# Patient Record
Sex: FEMALE | Race: WHITE | NOT HISPANIC OR LATINO | Employment: PART TIME | URBAN - METROPOLITAN AREA
[De-identification: names, ages, dates, MRNs, and addresses within clinical notes are randomized per-mention and may not be internally consistent; named-entity substitution may affect disease eponyms.]

---

## 2021-03-31 DIAGNOSIS — Z23 ENCOUNTER FOR IMMUNIZATION: ICD-10-CM

## 2022-02-22 ENCOUNTER — OFFICE VISIT (OUTPATIENT)
Dept: OBGYN CLINIC | Facility: MEDICAL CENTER | Age: 37
End: 2022-02-22
Payer: COMMERCIAL

## 2022-02-22 ENCOUNTER — TELEPHONE (OUTPATIENT)
Dept: POSTPARTUM | Facility: CLINIC | Age: 37
End: 2022-02-22

## 2022-02-22 VITALS
HEIGHT: 64 IN | SYSTOLIC BLOOD PRESSURE: 120 MMHG | BODY MASS INDEX: 31.76 KG/M2 | WEIGHT: 186 LBS | DIASTOLIC BLOOD PRESSURE: 86 MMHG

## 2022-02-22 DIAGNOSIS — R45.89 FEELING OF SADNESS: ICD-10-CM

## 2022-02-22 DIAGNOSIS — Z01.419 ROUTINE GYNECOLOGICAL EXAMINATION: Primary | ICD-10-CM

## 2022-02-22 DIAGNOSIS — R53.83 LOW ENERGY: ICD-10-CM

## 2022-02-22 DIAGNOSIS — Z30.41 ENCOUNTER FOR SURVEILLANCE OF CONTRACEPTIVE PILLS: ICD-10-CM

## 2022-02-22 PROCEDURE — S0610 ANNUAL GYNECOLOGICAL EXAMINA: HCPCS | Performed by: PHYSICIAN ASSISTANT

## 2022-02-22 RX ORDER — NORETHINDRONE ACETATE AND ETHINYL ESTRADIOL AND FERROUS FUMARATE 1MG-20(21)
KIT ORAL
COMMUNITY
Start: 2022-01-21 | End: 2022-02-22 | Stop reason: SDUPTHER

## 2022-02-22 RX ORDER — NORETHINDRONE ACETATE AND ETHINYL ESTRADIOL AND FERROUS FUMARATE 1MG-20(21)
1 KIT ORAL DAILY
Qty: 90 TABLET | Refills: 4 | Status: SHIPPED | OUTPATIENT
Start: 2022-02-22

## 2022-02-22 NOTE — PROGRESS NOTES
Assessment   29 y o   [id number] presenting for annual exam      Plan   Diagnoses and all orders for this visit:    Routine gynecological examination    Encounter for surveillance of contraceptive pills  -     Junel FE 1/20 1-20 MG-MCG per tablet; Take 1 tablet by mouth daily  Withdrawal bleeds are light and reg on AMPARO  Denies ACHES and desires to continue  Rx sent to pharmacy  Low energy  -     TSH, 3rd generation with Free T4 reflex; Future    Will check thyroid function to r/o as contributing to low energy/ fatigue, weight gain, etc      Feeling of sadness  -     Ambulatory Referral to Psychiatry; Future  Encouraged to establish with a counselor/therapist  Referral to [facility] and [address] provided  She will consider  Encouraged to continue exercise and current coping measures  Pap due         Encouraged 150 min of exercise per week  Reviewed balanced diet  To start Prenatal Vitamin      RTO one year for yearly exam or sooner as needed  __________________________________________________________________    Subjective     Laly Vazquez is a 29 y o     with regular menses who is sexually active and currently using Lonnie Dines for contraception presenting for annual exam  STD testing: She does not want STD testing today  C/o low energy/fatigue, weight gain, and not feeling like herself over the last year to few years  Notes most significant change occurred after suffering miscarriage but seems to have increased over the past year or two  Notes she has trouble finding energy and motivation for simple tasks, I e  working out, is unhappy that she is currently heavier than she has ever been in her life, and not feeling like herself  Feels symptoms stemmed after she suffered a miscarriage at approximately 12w gestation  Over the past few months  has brought up idea of having another child  Magalie Llamas is unsure how she feels about this, noting she is not sure if she is ready yet   (becomes tearful in office)  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 seeing one but has not yet tried to establish  New to the area  No SI or HI reported  SCREENING  Last Pap: 2020 NILM / Neg ([facility]where from [facility])     GYN  Complaints: denies  Denies dyspareunia, genital discharge, genital ulcers, pelvic pain and vulvar/vaginal symptoms  Periods are regular every 28-30 days, lasting 5 days  Reports light to moderate flow  Dysmenorrhea: some mild dysmenorrhea with last period  Notes she was a few days late on starting that pill pack due to awaiting refill from prior provider  Also had COVID 19 shot 2021  Will plan to monitor upcoming periods for a change  Sexually active: Yes - single partner -   Hx STI: denies   Hx Abnormal pap: reports abnormal Pap as a teenager  Negative on f/u and has been negative since  We reviewed ASCCP guidelines for Pap testing today  This low risk patient meets criteria for q 3 year testing  Gardasil: She has not  completed the Gardasil series  Reviewed option for completion  She will consider  OB    (as above)      Complaints: denies  Denies change in urinary stream, dysuria, hematuria and nocturia    BREAST  Complaints: denies  Denies: breast lump, breast tenderness, dryness, nipple discharge, pruritus, skin color change and skin lesion(s)  Personal hx: None  Family hx: Denies fhx of breast, ovarian, or colon cancers    GENERAL  PMH reviewed/updated and is as below  Patient has not yet established with a PCP  Provided with local options  Exercise: Walking trail near house x 1 hour, usually a few x per week  Diet: trying to improve this      Past Medical History:   Diagnosis Date    Miscarriage     4 years ago since         History reviewed  No pertinent surgical history        Current Outpatient Medications:     Junel FE 1/20 1-20 MG-MCG per tablet, Take 1 tablet by mouth daily, Disp: 90 tablet, Rfl: 4    Allergies   Allergen Reactions    Ciprofloxacin Anaphylaxis and Swelling       Social History     Socioeconomic History    Marital status: /Civil Union     Spouse name: Not on file    Number of children: Not on file    Years of education: Not on file    Highest education level: Not on file   Occupational History    Not on file   Tobacco Use    Smoking status: Never Smoker    Smokeless tobacco: Never Used   Vaping Use    Vaping Use: Never used   Substance and Sexual Activity    Alcohol use: Yes     Alcohol/week: 1 0 standard drink     Types: 1 Glasses of wine per week     Comment: Social     Drug use: Never    Sexual activity: Yes     Partners: Male     Birth control/protection: OCP   Other Topics Concern    Not on file   Social History Narrative    Not on file     Social Determinants of Health     Financial Resource Strain: Not on file   Food Insecurity: Not on file   Transportation Needs: Not on file   Physical Activity: Not on file   Stress: Not on file   Social Connections: Not on file   Intimate Partner Violence: Not on file   Housing Stability: Not on file       Review of Systems     ROS:  Constitutional: Weight gain, fatiuge  Negative for appetite change  Respiratory: Negative for cough, wheezing, shortness of breath  Cardiovascular: Negative for chest pain, palpitations, and leg swelling  Gastrointestinal: Negative for abdominal pain and change in bowel habits/constipation/diarrhea  Breasts:  Negative for tenderness, pain or masses  Genitourinary: Negative for difficulty urinating, pelvic pain, vaginal bleeding, vaginal discharge, itching or odor  Psychiatric: Not feeling like self, sadness  Negative for anxiety       Objective      /86   Ht 5' 4" (1 626 m)   Wt 84 4 kg (186 lb)   LMP 02/15/2022   BMI 31 93 kg/m²     Physical Examination:    Patient appears well and is not in distress  Neck is supple without masses  Breasts are symmetrical without mass, tenderness, nipple discharge, skin changes or adenopathy  Heart: RRR  Lungs: CTA b/l  Abdomen is soft and nontender without masses  External genitals are normal without lesions or rashes  Urethral meatus and urethra are normal  Bladder is normal to palpation  Vagina is normal without discharge or bleeding  Cervix is normal without discharge or lesion  Uterus is normal, mobile, nontender without palpable mass  Adnexa are normal, nontender, without palpable mass

## 2022-04-06 ENCOUNTER — APPOINTMENT (OUTPATIENT)
Dept: LAB | Facility: MEDICAL CENTER | Age: 37
End: 2022-04-06
Payer: COMMERCIAL

## 2022-04-06 DIAGNOSIS — R53.83 LOW ENERGY: ICD-10-CM

## 2022-04-06 LAB — TSH SERPL DL<=0.05 MIU/L-ACNC: 3.36 UIU/ML (ref 0.45–4.5)

## 2022-04-06 PROCEDURE — 84443 ASSAY THYROID STIM HORMONE: CPT

## 2022-04-06 PROCEDURE — 36415 COLL VENOUS BLD VENIPUNCTURE: CPT

## 2022-11-17 ENCOUNTER — OFFICE VISIT (OUTPATIENT)
Dept: FAMILY MEDICINE CLINIC | Facility: CLINIC | Age: 37
End: 2022-11-17

## 2022-11-17 VITALS
HEIGHT: 64 IN | WEIGHT: 194 LBS | OXYGEN SATURATION: 98 % | DIASTOLIC BLOOD PRESSURE: 82 MMHG | HEART RATE: 89 BPM | BODY MASS INDEX: 33.12 KG/M2 | SYSTOLIC BLOOD PRESSURE: 125 MMHG

## 2022-11-17 DIAGNOSIS — E78.2 MIXED HYPERLIPIDEMIA: ICD-10-CM

## 2022-11-17 DIAGNOSIS — T78.40XA ALLERGY, INITIAL ENCOUNTER: ICD-10-CM

## 2022-11-17 DIAGNOSIS — K21.9 GASTROESOPHAGEAL REFLUX DISEASE WITHOUT ESOPHAGITIS: Primary | ICD-10-CM

## 2022-11-17 RX ORDER — FEXOFENADINE HCL 180 MG/1
180 TABLET ORAL DAILY
Qty: 30 TABLET | Refills: 4 | Status: SHIPPED | OUTPATIENT
Start: 2022-11-17

## 2022-11-17 RX ORDER — OMEPRAZOLE 20 MG/1
20 CAPSULE, DELAYED RELEASE ORAL DAILY
Qty: 90 CAPSULE | Refills: 0 | Status: SHIPPED | OUTPATIENT
Start: 2022-11-17

## 2022-11-17 RX ORDER — OMEPRAZOLE 20 MG/1
20 CAPSULE, DELAYED RELEASE ORAL DAILY
COMMUNITY
End: 2022-11-17 | Stop reason: SDUPTHER

## 2022-11-17 NOTE — ASSESSMENT & PLAN NOTE
Seasonal allergies  Responding well to seasonal use Allegra  Continue same  Medication refill called in

## 2022-11-17 NOTE — PROGRESS NOTES
Subjective:      Patient ID: Jose Talley is a 40 y o  female  HPI    Patient is here to establish care  States that she has history of acid reflux with started many years ago  Patient has tried many over-the-counter medications  Recently she has been on omeprazole  Patient takes the medication intermittently as needed only  States that she had an EGD many years ago and was advised that her  Lower esophageal sphincter was not tight  Also reports symptoms of seasonal allergies  Takes OTC allergy medication, prn as needed only  Requesting medication refills  No recent metabolic labs  Was told that her cholesterol was elevated when she went for blood donation  No fup on that  Patient denies any symptoms of chest pain or shortness of breath  Past Medical History:   Diagnosis Date   • Allergic    • GERD (gastroesophageal reflux disease)    • Miscarriage     4 years ago since 2022        Family History   Problem Relation Age of Onset   • Hypertension Mother    • Diabetes Mother    • Diabetes Father    • Thyroid disease Father    • Heart attack Maternal Grandmother    • Heart disease Maternal Grandmother    • Cancer Maternal Grandfather    • Dementia Paternal Grandmother    • Cancer Paternal Grandfather        History reviewed  No pertinent surgical history  reports that she has never smoked  She has never used smokeless tobacco  She reports current alcohol use of about 1 0 standard drink per week  She reports that she does not use drugs        Current Outpatient Medications:   •  Junel FE 1/20 1-20 MG-MCG per tablet, Take 1 tablet by mouth daily, Disp: 90 tablet, Rfl: 4  •  omeprazole (PriLOSEC) 20 mg delayed release capsule, Take 20 mg by mouth daily, Disp: , Rfl:     The following portions of the patient's history were reviewed and updated as appropriate: allergies, current medications, past family history, past medical history, past social history, past surgical history and problem list     Review of Systems   Constitutional: Negative  HENT:        Seasonal symptoms of nasal congestion due to seasonal allergies  Respiratory: Negative  Negative for shortness of breath  Cardiovascular: Negative  Negative for chest pain, palpitations and PND  Musculoskeletal: Negative for myalgias  Neurological: Negative  Psychiatric/Behavioral: Negative  Objective:    /82   Pulse 89   Ht 5' 4" (1 626 m)   Wt 88 kg (194 lb)   SpO2 98%   BMI 33 30 kg/m²      Physical Exam  Constitutional:       Appearance: She is well-developed and well-nourished  Cardiovascular:      Rate and Rhythm: Normal rate and regular rhythm  Heart sounds: Normal heart sounds  Pulmonary:      Effort: Pulmonary effort is normal       Breath sounds: Normal breath sounds  Abdominal:      General: Bowel sounds are normal       Palpations: Abdomen is soft  Neurological:      Mental Status: She is alert and oriented to person, place, and time  Psychiatric:         Behavior: Behavior normal          Judgment: Judgment normal            No results found for this or any previous visit (from the past 1008 hour(s))  Assessment/Plan:    No problem-specific Assessment & Plan notes found for this encounter  Problem List Items Addressed This Visit        Digestive    Gastroesophageal reflux disease without esophagitis - Primary     Chronic issue, since teenage  On otc Omeprazole prn only  Recent worsening of symptoms  Has EGD many years ago  Was advised that she has week LES  Continue Omeprazole, referred to GI  Relevant Medications    omeprazole (PriLOSEC) 20 mg delayed release capsule    Other Relevant Orders    Ambulatory Referral to Gastroenterology       Other    Allergies     Seasonal allergies  Responding well to seasonal use Allegra  Continue same  Medication refill called in           Relevant Medications    fexofenadine (ALLEGRA) 180 MG tablet    Mixed hyperlipidemia     Patient has a past history of dyslipidemia  Recommended a lipid panel to assess levels  Relevant Orders    Comprehensive metabolic panel    Lipid panel     I have spent 45 minutes with Patient  today in which greater than 50% of this time was spent in counseling/coordination of care regarding Prognosis, Risks and benefits of tx options, Intructions for management, Patient and family education, Importance of tx compliance, Risk factor reductions and Impressions

## 2022-11-17 NOTE — ASSESSMENT & PLAN NOTE
Chronic issue, since teenage  On otc Omeprazole prn only  Recent worsening of symptoms  Has EGD many years ago  Was advised that she has week LES  Continue Omeprazole, referred to GI

## 2022-11-28 LAB
ALBUMIN SERPL-MCNC: 4 G/DL (ref 3.6–5.1)
ALBUMIN/GLOB SERPL: 1.5 (CALC) (ref 1–2.5)
ALP SERPL-CCNC: 55 U/L (ref 31–125)
ALT SERPL-CCNC: 17 U/L (ref 6–29)
AST SERPL-CCNC: 18 U/L (ref 10–30)
BILIRUB SERPL-MCNC: 0.4 MG/DL (ref 0.2–1.2)
BUN SERPL-MCNC: 17 MG/DL (ref 7–25)
BUN/CREAT SERPL: NORMAL (CALC) (ref 6–22)
CALCIUM SERPL-MCNC: 9.3 MG/DL (ref 8.6–10.2)
CHLORIDE SERPL-SCNC: 104 MMOL/L (ref 98–110)
CHOLEST SERPL-MCNC: 256 MG/DL
CHOLEST/HDLC SERPL: 3.8 (CALC)
CO2 SERPL-SCNC: 28 MMOL/L (ref 20–32)
CREAT SERPL-MCNC: 0.84 MG/DL (ref 0.5–0.97)
GFR/BSA.PRED SERPLBLD CYS-BASED-ARV: 92 ML/MIN/1.73M2
GLOBULIN SER CALC-MCNC: 2.7 G/DL (CALC) (ref 1.9–3.7)
GLUCOSE SERPL-MCNC: 89 MG/DL (ref 65–99)
HDLC SERPL-MCNC: 67 MG/DL
LDLC SERPL CALC-MCNC: 165 MG/DL (CALC)
NONHDLC SERPL-MCNC: 189 MG/DL (CALC)
POTASSIUM SERPL-SCNC: 4.1 MMOL/L (ref 3.5–5.3)
PROT SERPL-MCNC: 6.7 G/DL (ref 6.1–8.1)
SODIUM SERPL-SCNC: 139 MMOL/L (ref 135–146)
TRIGL SERPL-MCNC: 121 MG/DL

## 2022-11-29 ENCOUNTER — TELEPHONE (OUTPATIENT)
Dept: FAMILY MEDICINE CLINIC | Facility: CLINIC | Age: 37
End: 2022-11-29

## 2022-11-29 NOTE — TELEPHONE ENCOUNTER
----- Message from Deward Hodgkins, MD sent at 11/29/2022  1:06 PM EST -----  Please call the patient regarding her abnormal result  advise fup to review results

## 2022-12-05 DIAGNOSIS — E78.2 MIXED HYPERLIPIDEMIA: Primary | ICD-10-CM

## 2022-12-29 ENCOUNTER — OFFICE VISIT (OUTPATIENT)
Dept: FAMILY MEDICINE CLINIC | Facility: CLINIC | Age: 37
End: 2022-12-29

## 2022-12-29 VITALS
OXYGEN SATURATION: 99 % | DIASTOLIC BLOOD PRESSURE: 88 MMHG | BODY MASS INDEX: 33.13 KG/M2 | HEART RATE: 122 BPM | SYSTOLIC BLOOD PRESSURE: 130 MMHG | TEMPERATURE: 100.7 F | RESPIRATION RATE: 18 BRPM | WEIGHT: 193 LBS

## 2022-12-29 DIAGNOSIS — J01.10 ACUTE NON-RECURRENT FRONTAL SINUSITIS: ICD-10-CM

## 2022-12-29 DIAGNOSIS — J02.9 SORE THROAT: Primary | ICD-10-CM

## 2022-12-29 RX ORDER — AMOXICILLIN 875 MG/1
875 TABLET, COATED ORAL 2 TIMES DAILY
Qty: 14 TABLET | Refills: 0 | Status: SHIPPED | OUTPATIENT
Start: 2022-12-29 | End: 2022-12-29 | Stop reason: SDUPTHER

## 2022-12-29 RX ORDER — AMOXICILLIN 875 MG/1
875 TABLET, COATED ORAL 2 TIMES DAILY
Qty: 20 TABLET | Refills: 0 | Status: SHIPPED | OUTPATIENT
Start: 2022-12-29 | End: 2023-01-08

## 2022-12-29 NOTE — PROGRESS NOTES
Subjective:      Patient ID: Lorri Zaidi is a 40 y o  female  Sore Throat   This is a new problem  The current episode started in the past 7 days  The problem has been gradually worsening  Neither side of throat is experiencing more pain than the other  There has been no fever  The pain is at a severity of 7/10  Associated symptoms include congestion, coughing, swollen glands and trouble swallowing  She has had exposure to strep  Past Medical History:   Diagnosis Date   • Allergic    • GERD (gastroesophageal reflux disease)    • Miscarriage     4 years ago since 2022        Family History   Problem Relation Age of Onset   • Hypertension Mother    • Diabetes Mother    • Diabetes Father    • Thyroid disease Father    • Heart attack Maternal Grandmother    • Heart disease Maternal Grandmother    • Cancer Maternal Grandfather    • Dementia Paternal Grandmother    • Cancer Paternal Grandfather        History reviewed  No pertinent surgical history  reports that she has never smoked  She has never used smokeless tobacco  She reports current alcohol use of about 1 0 standard drink per week  She reports that she does not use drugs        Current Outpatient Medications:   •  amoxicillin (AMOXIL) 875 mg tablet, Take 1 tablet (875 mg total) by mouth 2 (two) times a day for 7 days, Disp: 14 tablet, Rfl: 0  •  fexofenadine (ALLEGRA) 180 MG tablet, Take 1 tablet (180 mg total) by mouth daily, Disp: 30 tablet, Rfl: 4  •  Junel FE 1/20 1-20 MG-MCG per tablet, Take 1 tablet by mouth daily, Disp: 90 tablet, Rfl: 4  •  omeprazole (PriLOSEC) 20 mg delayed release capsule, Take 1 capsule (20 mg total) by mouth daily, Disp: 90 capsule, Rfl: 0    The following portions of the patient's history were reviewed and updated as appropriate: allergies, current medications, past family history, past medical history, past social history, past surgical history and problem list     Review of Systems   HENT: Positive for congestion, sore throat and trouble swallowing  Respiratory: Positive for cough  Objective:    /88 (BP Location: Left arm, Patient Position: Sitting, Cuff Size: Large)   Pulse (!) 122   Temp (!) 100 7 °F (38 2 °C) (Tympanic)   Resp 18   Wt 87 5 kg (193 lb)   SpO2 99%   BMI 33 13 kg/m²      Physical Exam  Constitutional:       Appearance: Normal appearance  HENT:      Mouth/Throat:      Pharynx: Posterior oropharyngeal erythema present  No oropharyngeal exudate  Cardiovascular:      Heart sounds: Normal heart sounds  Pulmonary:      Breath sounds: Normal breath sounds  Recent Results (from the past 1008 hour(s))   Lipid panel    Collection Time: 11/28/22  7:11 AM   Result Value Ref Range    Total Cholesterol 256 (H) <200 mg/dL    HDL 67 > OR = 50 mg/dL    Triglycerides 121 <150 mg/dL    LDL Calculated 165 (H) mg/dL (calc)    Chol HDLC Ratio 3 8 <5 0 (calc)    Non-HDL Cholesterol 189 (H) <130 mg/dL (calc)   Comprehensive metabolic panel    Collection Time: 11/28/22  7:11 AM   Result Value Ref Range    Glucose, Random 89 65 - 99 mg/dL    BUN 17 7 - 25 mg/dL    Creatinine 0 84 0 50 - 0 97 mg/dL    eGFR 92 > OR = 60 mL/min/1 73m2    SL AMB BUN/CREATININE RATIO NOT APPLICABLE 6 - 22 (calc)    Sodium 139 135 - 146 mmol/L    Potassium 4 1 3 5 - 5 3 mmol/L    Chloride 104 98 - 110 mmol/L    CO2 28 20 - 32 mmol/L    Calcium 9 3 8 6 - 10 2 mg/dL    Protein, Total 6 7 6 1 - 8 1 g/dL    Albumin 4 0 3 6 - 5 1 g/dL    Globulin 2 7 1 9 - 3 7 g/dL (calc)    Albumin/Globulin Ratio 1 5 1 0 - 2 5 (calc)    TOTAL BILIRUBIN 0 4 0 2 - 1 2 mg/dL    Alkaline Phosphatase 55 31 - 125 U/L    AST 18 10 - 30 U/L    ALT 17 6 - 29 U/L       Assessment/Plan:    No problem-specific Assessment & Plan notes found for this encounter             Problem List Items Addressed This Visit        Respiratory    Acute non-recurrent frontal sinusitis    Relevant Medications    amoxicillin (AMOXIL) 875 mg tablet       Other    Sore throat - Primary    Relevant Medications    amoxicillin (AMOXIL) 875 mg tablet

## 2023-02-27 PROBLEM — J01.10 ACUTE NON-RECURRENT FRONTAL SINUSITIS: Status: RESOLVED | Noted: 2022-12-29 | Resolved: 2023-02-27

## 2023-03-23 ENCOUNTER — TELEPHONE (OUTPATIENT)
Dept: GASTROENTEROLOGY | Facility: CLINIC | Age: 38
End: 2023-03-23

## 2023-11-16 ENCOUNTER — OFFICE VISIT (OUTPATIENT)
Dept: OBGYN CLINIC | Facility: CLINIC | Age: 38
End: 2023-11-16
Payer: COMMERCIAL

## 2023-11-16 VITALS
SYSTOLIC BLOOD PRESSURE: 130 MMHG | BODY MASS INDEX: 34.31 KG/M2 | WEIGHT: 201 LBS | HEIGHT: 64 IN | DIASTOLIC BLOOD PRESSURE: 84 MMHG

## 2023-11-16 DIAGNOSIS — Z11.3 SCREENING FOR STD (SEXUALLY TRANSMITTED DISEASE): ICD-10-CM

## 2023-11-16 DIAGNOSIS — Z12.4 SCREENING FOR MALIGNANT NEOPLASM OF THE CERVIX: ICD-10-CM

## 2023-11-16 DIAGNOSIS — N91.2 AMENORRHEA: ICD-10-CM

## 2023-11-16 DIAGNOSIS — Z32.01 POSITIVE PREGNANCY TEST: ICD-10-CM

## 2023-11-16 DIAGNOSIS — K21.9 GASTROESOPHAGEAL REFLUX DISEASE WITHOUT ESOPHAGITIS: ICD-10-CM

## 2023-11-16 DIAGNOSIS — Z01.419 ENCOUNTER FOR GYNECOLOGICAL EXAMINATION (GENERAL) (ROUTINE) WITHOUT ABNORMAL FINDINGS: Primary | ICD-10-CM

## 2023-11-16 PROBLEM — J02.9 SORE THROAT: Status: RESOLVED | Noted: 2022-12-29 | Resolved: 2023-11-16

## 2023-11-16 PROCEDURE — S0612 ANNUAL GYNECOLOGICAL EXAMINA: HCPCS | Performed by: PHYSICIAN ASSISTANT

## 2023-11-16 PROCEDURE — G0476 HPV COMBO ASSAY CA SCREEN: HCPCS | Performed by: PHYSICIAN ASSISTANT

## 2023-11-16 PROCEDURE — 87591 N.GONORRHOEAE DNA AMP PROB: CPT | Performed by: PHYSICIAN ASSISTANT

## 2023-11-16 PROCEDURE — G0145 SCR C/V CYTO,THINLAYER,RESCR: HCPCS | Performed by: PHYSICIAN ASSISTANT

## 2023-11-16 PROCEDURE — 87491 CHLMYD TRACH DNA AMP PROBE: CPT | Performed by: PHYSICIAN ASSISTANT

## 2023-11-16 RX ORDER — FAMOTIDINE 20 MG/1
20 TABLET, FILM COATED ORAL 2 TIMES DAILY
Qty: 60 TABLET | Refills: 3 | Status: SHIPPED | OUTPATIENT
Start: 2023-11-16

## 2023-11-16 NOTE — PROGRESS NOTES
ASSESSMENT & PLAN: Cely Sims is a 41 y.o.  with normal gynecologic exam.    1.  Routine well woman exam done today  2. Pap and HPV:  The patient's last pap and hpv was unknown. It was normal per patient. Pap and cotesting was done today. Current ASCCP Guidelines reviewed. GC/CT screen also performed   3. The following were reviewed in today's visit: breast self exam, STD testing, family planning choices, adequate intake of calcium and vitamin D, exercise, healthy diet, and age appropriate recommendations regarding screenings and prevention. 4. Positive home UPT, LMP 10/11/2023, 5w1d gest today. Pregnancy planned and accepted. Counseled pt w/ first trimester recommendations, common symptoms and things to look out for. Continue PNV daily, stressed hydration, well balanced diet and exercise as tolerated. She is on daily PPI for chronic GERD. Will slowly transition pt to pepcid as tolerated, will monitor. Will have pt scheduled for viability between 8-9 wks. Will then have pt meet with nurse for intake and start prenatal care. Pt encouraged to call in interim with any concerns or problems. RTO in 3-4 wks viability w/ OB. CC:  Annual Gynecologic Examination    HPI: Cely Sims is a 41 y.o. Kamille Diss who presents for annual gynecologic examination. She is a new patient with our office. She has the following concerns:  she reports recent positive pregnancy test 2 days ago. Her and partner and have been trying x 1 yr. States missed period and had positive home pregnancy test . LMP:10/11/2023; 5w1d, GRABIEL 2024. She has started PNV which she has been taking for months. She feels well, slight nausea in AM, no vomiting. Slight pelvic pressure, no pain or cramping. Denies vaginal discharge or bleeding. Pregnancy 2016 vaginal delivery - uncomplicated pregnancy, except for induced 2 weeks prior due to high BP. Denies preeclampsia dx.   She was not on medication for BP during or prior to pregnacy. Denies HTN at present. miscarriage in 2017 (approx 8 wks gest.)  She denies any pre-existing pelvic or homronal problems. She does take omeprazole daily for GERD. Needs it daily. Healthy diet Yes; drinks primarily water. 1 cup decaf coffee in Am. Vitamins Yes; PNV  Exercise Yes; 5 x a week. Patient's last menstrual period was 10/11/2023. Periods prior to pregnancy regular cycles. States would spot for a few days then stop for 2-3 days, then bleeding moderate x 5 days. Average cramping. Health Maintenance:      She does perform irregular monthly self breast exams. Generalized breast soreness since pos UPT, denies breast lump, swelling, nipple discharge or skin changes. She feels safe at home. Feels safe in relationship with her partner. Past Medical History:   Diagnosis Date    Allergic     GERD (gastroesophageal reflux disease)     Miscarriage     4 years ago since         History reviewed. No pertinent surgical history. Past OB/Gyn History:  OB History          2    Para   1    Term   1            AB   1    Living   1         SAB   1    IAB        Ectopic        Multiple        Live Births   1                 History of sexually transmitted infection: denies hx of gential herpes, CT, GC, trich. History of abnormal pap smears: early 20's . Had 1 colpo negative. Normal since. Patient is currently sexually active. heterosexual. Monogamous with . The current method of family planning is none.     Family History   Problem Relation Age of Onset    Hypertension Mother     Diabetes Mother     Diabetes Father     Thyroid disease Father     Heart attack Maternal Grandmother     Heart disease Maternal Grandmother     Cancer Maternal Grandfather     Dementia Paternal Grandmother     Cancer Paternal Grandfather        Family history of Breast/Uterine/Ovarian/Colon Cancer: colon ca grandfather    Social History:  Social History     Socioeconomic History    Marital status: /Civil Union     Spouse name: Not on file    Number of children: Not on file    Years of education: Not on file    Highest education level: Not on file   Occupational History    Not on file   Tobacco Use    Smoking status: Never    Smokeless tobacco: Never   Vaping Use    Vaping Use: Never used   Substance and Sexual Activity    Alcohol use: Yes     Alcohol/week: 1.0 standard drink of alcohol     Types: 1 Glasses of wine per week     Comment: Social     Drug use: Never    Sexual activity: Yes     Partners: Male     Birth control/protection: OCP   Other Topics Concern    Not on file   Social History Narrative    Not on file     Social Determinants of Health     Financial Resource Strain: Not on file   Food Insecurity: Not on file   Transportation Needs: Not on file   Physical Activity: Not on file   Stress: Not on file   Social Connections: Not on file   Intimate Partner Violence: Not on file   Housing Stability: Not on file         Allergies   Allergen Reactions    Ciprofloxacin Anaphylaxis and Swelling         Current Outpatient Medications:     famotidine (PEPCID) 20 mg tablet, Take 1 tablet (20 mg total) by mouth 2 (two) times a day, Disp: 60 tablet, Rfl: 3    omeprazole (PriLOSEC) 20 mg delayed release capsule, Take 1 capsule (20 mg total) by mouth daily, Disp: 90 capsule, Rfl: 0      Review of Systems  Constitutional :no fever, feels well, no tiredness, no recent weight gain or loss  ENT: no ear ache, no loss of hearing, no nosebleeds or nasal discharge, no sore throat or hoarseness. Cardiovascular: no complaints of slow or fast heart beat, no chest pain, no palpitations, no leg claudication or lower extremity edema. Respiratory: no complaints of shortness of shortness of breath, no MUÑOZ  Breasts:generalized mild breast soreness since pos UPT; no complaints of breast lump, or nipple discharge or skin change.   Gastrointestinal: nausea since UPT;  GERD stable with daily PPI; no complaints of abdominal pain, constipation, vomiting, or diarrhea or bloody stools  Genitourinary : no complaints of dysuria, incontinence, pelvic pain, no dysmenorrhea, vaginal discharge/itch/odor or abnormal vaginal bleeding and as noted in HPI. Musculoskeletal: no complaints of arthralgia, no myalgia, no joint swelling or stiffness, no limb pain or swelling. Integumentary: no complaints of skin rash or lesion, itching or dry skin  Neurological: no complaints of headache, no confusion, no numbness or tingling, no dizziness or fainting  Mental health: no anxiety, depression, SI    Objective      /84 (BP Location: Left arm)   Ht 5' 4" (1.626 m)   Wt 91.2 kg (201 lb)   LMP 10/11/2023   BMI 34.50 kg/m²   General:   appears stated age, cooperative, alert normal mood and affect. BMI 34.50   Neck: normal, supple,trachea midline, no masses. Thyroid palpated normal.    Heart: regular rate and rhythm, S1, S2 normal, no murmur, click, rub or gallop   Lungs: clear to auscultation bilaterally   Breasts: normal appearance, no masses or tenderness, No nipple retraction or dimpling, No nipple discharge or bleeding, No axillary or supraclavicular adenopathy, Normal to palpation without dominant masses   Abdomen: soft, non-tender, without masses or organomegaly   Vulva: normal female genitalia, no lesions   Vagina: normal vagina, no discharge, exudate, lesion, or erythema   Urethra: normal   Cervix: Normal, no discharge. PAP done. Nontender. Uterus: normal   Adnexa: no mass, fullness, tenderness   Lymphatic palpation of lymph nodes in neck, axilla, groin and/or other locations: no lymphadenopathy or masses noted   Skin normal skin turgor and no rashes.    Psychiatric orientation to person, place, and time: normal. mood and affect: normal

## 2023-11-18 LAB
C TRACH DNA SPEC QL NAA+PROBE: NEGATIVE
N GONORRHOEA DNA SPEC QL NAA+PROBE: NEGATIVE

## 2023-11-20 LAB
HPV HR 12 DNA CVX QL NAA+PROBE: NEGATIVE
HPV16 DNA CVX QL NAA+PROBE: NEGATIVE
HPV18 DNA CVX QL NAA+PROBE: NEGATIVE

## 2023-11-27 LAB
LAB AP GYN PRIMARY INTERPRETATION: NORMAL
Lab: NORMAL

## 2023-12-08 DIAGNOSIS — K21.9 GASTROESOPHAGEAL REFLUX DISEASE WITHOUT ESOPHAGITIS: ICD-10-CM

## 2023-12-08 RX ORDER — FAMOTIDINE 20 MG/1
20 TABLET, FILM COATED ORAL 2 TIMES DAILY
Qty: 180 TABLET | Refills: 1 | Status: SHIPPED | OUTPATIENT
Start: 2023-12-08

## 2023-12-18 ENCOUNTER — OFFICE VISIT (OUTPATIENT)
Dept: OBGYN CLINIC | Facility: CLINIC | Age: 38
End: 2023-12-18
Payer: COMMERCIAL

## 2023-12-18 VITALS
BODY MASS INDEX: 33.97 KG/M2 | DIASTOLIC BLOOD PRESSURE: 86 MMHG | HEIGHT: 64 IN | SYSTOLIC BLOOD PRESSURE: 130 MMHG | WEIGHT: 199 LBS

## 2023-12-18 DIAGNOSIS — N91.2 AMENORRHEA: Primary | ICD-10-CM

## 2023-12-18 LAB — SL AMB POCT URINE HCG: POSITIVE

## 2023-12-18 PROCEDURE — 81025 URINE PREGNANCY TEST: CPT | Performed by: OBSTETRICS & GYNECOLOGY

## 2023-12-18 PROCEDURE — 99213 OFFICE O/P EST LOW 20 MIN: CPT | Performed by: OBSTETRICS & GYNECOLOGY

## 2023-12-18 PROCEDURE — 76817 TRANSVAGINAL US OBSTETRIC: CPT | Performed by: OBSTETRICS & GYNECOLOGY

## 2023-12-18 NOTE — PROGRESS NOTES
"Assessment/Plan:     Diagnoses and all orders for this visit:    Amenorrhea  -     POCT urine HCG    Other orders  -     Prenatal Vit-Fe Fumarate-FA (PRENATAL VITAMINS PO); Take by mouth     38-year-old female  Prior vaginal delivery  History of gestational hypertension in the prior pregnancy  Amenorrhea viable IUP 9 weeks and 4 days by CRL  Plan  Prenatal vitamin daily  Goal of blood pressure less than 140/90 reviewed and discussed with patient  Baby aspirin 2 tabs at bedtime till 36 weeks  To be seen for OB intake    Subjective:      Patient ID: Adele Campbell is a 38 y.o. female.    HPI  38-year-old female presents to the office today for pregnancy confirmation and viability scan  She denies any complaint today except mild nausea and fatigue  Last menstrual period was 10/11 as per patient does not remember the exact date  This pregnancy is planned  The following portions of the patient's history were reviewed and updated as appropriate: allergies, current medications, past family history, past medical history, past social history, past surgical history and problem list.    Review of Systems   Constitutional:  Positive for fatigue. Negative for activity change, appetite change, chills and fever.   Respiratory:  Negative for apnea, cough, chest tightness and shortness of breath.    Cardiovascular:  Negative for chest pain, palpitations and leg swelling.   Gastrointestinal:  Positive for nausea. Negative for abdominal pain, constipation, diarrhea and vomiting.   Genitourinary:  Negative for difficulty urinating, dysuria, flank pain, frequency, hematuria and urgency.   Neurological:  Negative for dizziness, seizures, syncope, light-headedness, numbness and headaches.   Psychiatric/Behavioral:  Negative for agitation and confusion.          Objective:      /86 (BP Location: Left arm, Patient Position: Sitting, Cuff Size: Adult)   Ht 5' 4\" (1.626 m)   Wt 90.3 kg (199 lb)   LMP 10/16/2023   Breastfeeding No  "  BMI 34.16 kg/m²          Physical Exam  Constitutional:       Appearance: She is well-developed.   Abdominal:      General: There is no distension.      Palpations: Abdomen is soft.      Tenderness: There is no abdominal tenderness.   Genitourinary:     Labia:         Right: No rash, tenderness or lesion.         Left: No rash, tenderness or lesion.       Vagina: No signs of injury. No vaginal discharge, erythema or tenderness.      Cervix: No cervical motion tenderness, discharge or friability.      Adnexa:         Right: No mass, tenderness or fullness.          Left: No mass, tenderness or fullness.        Comments: Bedside ultrasound performed intrauterine pregnancy 9 weeks and 4 days by CRL fetal heart rate 130 bpm  Neurological:      Mental Status: She is alert and oriented to person, place, and time.   Psychiatric:         Behavior: Behavior normal.

## 2023-12-27 ENCOUNTER — INITIAL PRENATAL (OUTPATIENT)
Dept: OBGYN CLINIC | Facility: CLINIC | Age: 38
End: 2023-12-27

## 2023-12-27 VITALS
DIASTOLIC BLOOD PRESSURE: 78 MMHG | WEIGHT: 199.8 LBS | HEIGHT: 64 IN | SYSTOLIC BLOOD PRESSURE: 122 MMHG | BODY MASS INDEX: 34.11 KG/M2

## 2023-12-27 DIAGNOSIS — Z31.430 ENCOUNTER OF FEMALE FOR TESTING FOR GENETIC DISEASE CARRIER STATUS FOR PROCREATIVE MANAGEMENT: ICD-10-CM

## 2023-12-27 DIAGNOSIS — Z36.9 ENCOUNTER FOR ANTENATAL SCREENING: Primary | ICD-10-CM

## 2023-12-27 RX ORDER — ASPIRIN 81 MG/1
81 TABLET, CHEWABLE ORAL DAILY
COMMUNITY

## 2023-12-27 NOTE — PROGRESS NOTES
.OB Intake    Patient presents for OB intake interview.  Accompanied by: herself   planned pregnancy, FOB involved and supportive.      Hx of  delivery prior to 36 weeks 6 days: no  Hx of hypertension:  yes   Patient's last menstrual period was 10/11/2023. Estimated Date of Delivery: 2024  Signs and Symptoms of pregnancy:  fatigue  Constipation: no  Headaches: no  Cramping/spotting: no  PICA cravings: no  Cats:No  Diabetes:   History of gestational diabetes no  BMI >35  yes  Advance maternal age >35 yes  First degree relative with type 2 diabetes no  History of PCOS no  Current metformin use no  Prior history of macrosomia or LGA no    Prenatal labs including: CBC,ABO, Rubella, Hepatitis, RPR,HIV, , varicella, hemoglobin electrophoresis, Tsh/Free T 4   Last Pap:  2023  Tdap - counseled to be given after 27 weeks  Influenza vaccine discussed and information sheet given.  Vaccinated: No  COVId Vaccine :yes  Hx of MRSA: no  Dental visit with last 6 months - yes   recommendations discussed.   Patient's PHQ-9 score today was 3  Interview education:  [facility] Pregnancy Essentials booklet reviewed and explained. Handouts given at today's visit.   [facility] Baby & me phone application guide   [facility] support center   [facility] Maternal Fetal Medicine        Sequential screening pamphlet                   Cystic fibrosis information    [facility]: Yes  ONAF form submitted: No    Sussyt activated: Yes    Interview done by : Sandee Palomino LPN       23

## 2024-01-06 LAB
EXTERNAL ABO GROUPING: NORMAL
EXTERNAL ABO GROUPING: NORMAL
EXTERNAL HIV SCREEN: NORMAL
EXTERNAL RH FACTOR: POSITIVE
EXTERNAL RH FACTOR: POSITIVE
HCV AB SER-ACNC: NORMAL

## 2024-01-10 ENCOUNTER — ROUTINE PRENATAL (OUTPATIENT)
Facility: HOSPITAL | Age: 39
End: 2024-01-10
Attending: OBSTETRICS & GYNECOLOGY
Payer: COMMERCIAL

## 2024-01-10 VITALS
SYSTOLIC BLOOD PRESSURE: 130 MMHG | HEART RATE: 85 BPM | DIASTOLIC BLOOD PRESSURE: 84 MMHG | HEIGHT: 64 IN | BODY MASS INDEX: 33.84 KG/M2 | WEIGHT: 198.2 LBS

## 2024-01-10 DIAGNOSIS — Z3A.13 13 WEEKS GESTATION OF PREGNANCY: ICD-10-CM

## 2024-01-10 DIAGNOSIS — O09.521 ELDERLY MULTIGRAVIDA, FIRST TRIMESTER: Primary | ICD-10-CM

## 2024-01-10 DIAGNOSIS — Z36.82 ENCOUNTER FOR ANTENATAL SCREENING FOR NUCHAL TRANSLUCENCY: ICD-10-CM

## 2024-01-10 PROCEDURE — 36415 COLL VENOUS BLD VENIPUNCTURE: CPT | Performed by: OBSTETRICS & GYNECOLOGY

## 2024-01-10 PROCEDURE — 76801 OB US < 14 WKS SINGLE FETUS: CPT | Performed by: OBSTETRICS & GYNECOLOGY

## 2024-01-10 PROCEDURE — 76813 OB US NUCHAL MEAS 1 GEST: CPT | Performed by: OBSTETRICS & GYNECOLOGY

## 2024-01-10 PROCEDURE — 99203 OFFICE O/P NEW LOW 30 MIN: CPT | Performed by: OBSTETRICS & GYNECOLOGY

## 2024-01-10 NOTE — LETTER
January 12, 2024     Saida Jordan MD  [address]  [address]st [address]  [place] [address]    Patient: Adele Campbell   YOB: 1985   Date of Visit: 1/10/2024       Dear Dr. Jordan:    Thank you for referring Adele Campbell to me for evaluation. Below are my notes for this consultation.    If you have questions, please do not hesitate to call me. I look forward to following your patient along with you.         Sincerely,        Solo Mcgee MD        CC: No Recipients    Solo Mcgee MD  1/10/2024 10:43 AM  Sign when Signing Visit  Please refer to the [facility] ultrasound report in Ob Procedures for additional information regarding today's visit

## 2024-01-10 NOTE — PROGRESS NOTES
Please refer to the [facility] ultrasound report in Ob Procedures for additional information regarding today's visit

## 2024-01-10 NOTE — PROGRESS NOTES
"Patient chose to have [employer] Non-invasive Prenatal Screen with fetal sex.   Patient choose self-pay option.   Patient assistance program (PAP) application provided to patient no.  PAP sent with specimen No.     Patient given brochure and is aware [employer] will contact their insurance and coordinate coverage.  Patient made aware she will receive a text message and e-mail from [employer].   Patient informed text/phone call message will come from area code  \"415\".  Provided [facility] Client Services # [phone] and web site at [email or web address].   \"[place] your test online\" card with barcode and test tube ID provided to patient.   Reviewed [facility] web site states 5-7 business days for results via their portal.   [email or web address] message will be sent to patient when [facility] receives results /provider reviews.    2 vials of blood drawn from  right arm by Juliana Reyes MA.   Patient tolerated blood draw without difficulty.  Specimens labeled with patient identifiers (name, date of birth, specimen collection date), order and specimen were verified with patient, packed and sent via [facility] lab .  FED EX  tracking #  [id number]  Copy of lab order scanned to Epic media.    Maternal Fetal Medicine will have results in approximately 7-10 business days and will call patient or notify via [employer].  Patient aware viewing lab result online will reveal fetal sex if ordered.    Patient verbalized understanding of all instructions and no questions at this time.    "

## 2024-01-19 ENCOUNTER — TELEPHONE (OUTPATIENT)
Dept: OBGYN CLINIC | Facility: CLINIC | Age: 39
End: 2024-01-19

## 2024-01-19 DIAGNOSIS — R10.2 PELVIC PRESSURE IN PREGNANCY, ANTEPARTUM, SECOND TRIMESTER: ICD-10-CM

## 2024-01-19 DIAGNOSIS — O26.859 SPOTTING DURING PREGNANCY: ICD-10-CM

## 2024-01-19 DIAGNOSIS — O26.892 PELVIC PRESSURE IN PREGNANCY, ANTEPARTUM, SECOND TRIMESTER: ICD-10-CM

## 2024-01-19 DIAGNOSIS — Z3A.14 14 WEEKS GESTATION OF PREGNANCY: Primary | ICD-10-CM

## 2024-01-19 NOTE — TELEPHONE ENCOUNTER
Bud called and spoke to patient regarding symptoms.  She is 14 weeks gestation, had NT with MFM over a week ago.  States about 2 hours ago she noticed some light pink spotting from her vagina when she wiped.  She noticed this a few times.  She has noticed slight pelvic pressure since then.  Denies any significant pelvic pain or cramping.  Denies any heavy bleeding or clotting.  Reassurance given to patient would recommend urgent OB ultrasound to evaluate.  This was scheduled and she will call [facility] central scheduling to be completed today or tomorrow.  Strict ED precaution was discussed through the weekend with any increase in pelvic pain or cramping or any heavier bleeding prior to completing ultrasound.  Patient also given information to contact on-call physician to discuss if needed.  Patient expresses understanding and agreeable to plan.

## 2024-01-19 NOTE — TELEPHONE ENCOUNTER
Patient 14 weeks gestation, called with complaint of spotting, no sexual intercourse feels heaviness , blood type B +

## 2024-01-20 ENCOUNTER — HOSPITAL ENCOUNTER (OUTPATIENT)
Dept: ULTRASOUND IMAGING | Facility: HOSPITAL | Age: 39
Discharge: HOME | End: 2024-01-20
Payer: COMMERCIAL

## 2024-01-20 DIAGNOSIS — O26.892 PELVIC PRESSURE IN PREGNANCY, ANTEPARTUM, SECOND TRIMESTER: ICD-10-CM

## 2024-01-20 DIAGNOSIS — Z3A.14 14 WEEKS GESTATION OF PREGNANCY: ICD-10-CM

## 2024-01-20 DIAGNOSIS — O26.859 SPOTTING DURING PREGNANCY: ICD-10-CM

## 2024-01-20 DIAGNOSIS — R10.2 PELVIC PRESSURE IN PREGNANCY, ANTEPARTUM, SECOND TRIMESTER: ICD-10-CM

## 2024-01-20 PROCEDURE — 76815 OB US LIMITED FETUS(S): CPT

## 2024-01-25 ENCOUNTER — ROUTINE PRENATAL (OUTPATIENT)
Dept: OBGYN CLINIC | Facility: CLINIC | Age: 39
End: 2024-01-25
Payer: COMMERCIAL

## 2024-01-25 VITALS
WEIGHT: 195.4 LBS | DIASTOLIC BLOOD PRESSURE: 82 MMHG | HEIGHT: 64 IN | SYSTOLIC BLOOD PRESSURE: 118 MMHG | BODY MASS INDEX: 33.36 KG/M2 | BODY MASS INDEX: 33.36 KG/M2 | SYSTOLIC BLOOD PRESSURE: 118 MMHG | DIASTOLIC BLOOD PRESSURE: 82 MMHG | WEIGHT: 195.4 LBS | HEIGHT: 64 IN

## 2024-01-25 DIAGNOSIS — Z3A.15 15 WEEKS GESTATION OF PREGNANCY: ICD-10-CM

## 2024-01-25 DIAGNOSIS — O09.522 ADVANCED MATERNAL AGE IN MULTIGRAVIDA, SECOND TRIMESTER: ICD-10-CM

## 2024-01-25 DIAGNOSIS — Z36.9 ANTENATAL SCREENING ENCOUNTER: ICD-10-CM

## 2024-01-25 DIAGNOSIS — Z34.82 ENCOUNTER FOR SUPERVISION OF OTHER NORMAL PREGNANCY, SECOND TRIMESTER: Primary | ICD-10-CM

## 2024-01-25 LAB
EXTERNAL AFP: NORMAL
SL AMB  POCT GLUCOSE, UA: ABNORMAL
SL AMB  POCT GLUCOSE, UA: ABNORMAL
SL AMB LEUKOCYTE ESTERASE,UA: ABNORMAL
SL AMB LEUKOCYTE ESTERASE,UA: ABNORMAL
SL AMB POCT NITRITE,UA: ABNORMAL
SL AMB POCT NITRITE,UA: ABNORMAL
SL AMB POCT URINE PROTEIN: ABNORMAL
SL AMB POCT URINE PROTEIN: ABNORMAL

## 2024-01-25 PROCEDURE — 81002 URINALYSIS NONAUTO W/O SCOPE: CPT | Performed by: PHYSICIAN ASSISTANT

## 2024-01-25 PROCEDURE — PNV: Performed by: PHYSICIAN ASSISTANT

## 2024-01-25 NOTE — PROGRESS NOTES
Assessment      Pregnancy 15 and 1/7 weeks     Plan    Routine OB visit early second trimester   Problem list reviewed and updated -GERD, AMA, history of gestational hypertension.  Labs reviewed as below.    No current problems or symptoms.  Had some spotting last week with normal OB ultrasound with reassuring live intrauterine gestation.  GERD currently controlled on daily omeprazole, patient unresponsive to Pepcid, Tums and lifestyle modification previously.    Genetic screening tests discussed:  NIPS screening negative.  Patient counseled regarding AFP, offered and accepted.  AFP blood work to be completed between 15 to 20 weeks gestation. .  Role of ultrasound in pregnancy discussed; fetal survey:  Level 2 scheduled 3/6 .  Amniocentesis discussed: not indicated.    Blood pressure today 118/82,  with good fetal movement.  B+ blood type.  Urine dip trace leuks, trace protein    Continue PNV, ASA daily  Stressed well-balanced diet, staying well-hydrated drinking plenty of water, exercise as tolerated.    Follow up in 4 weeks.      Chief Complaint   Patient presents with    Routine Prenatal Visit     Some spotting last Friday, had ultrasound  at hospital, no nausea or vomiting , 15 weeks gestation         Subjective   Adele Campbell is a 38 y.o. female being seen today for her obstetrical visit. She is at 15w1d gestation.   AMA, GERD, anxiety, history of gestational hypertension.  Acid reflux unresponsive to Tums and Pepcid.  Doing well on Prilosec with stable symptoms.    She is taking PNV, ASA daily.    Patient reports no bleeding, no contractions, no cramping, no leaking, and denies headaches, blurry vision, dizziness, nausea/vomiting, abdominal pain, swelling.  Normal urination and bowel movements . Fetal movement:  Not yet appreciated. .    Prenatal lab results reviewed with patient in detail today with normal CBC, B+ blood type, nonreactive syphilis, hepatitis B surface antigen nonreactive, rubella  "immune, HIV nonreactive, hepatitis C nonreactive, hemoglobinopathy panel unremarkable.  Thyroid function normal, varicella immune.    Menstrual History:  OB History          3    Para   1    Term   1            AB   1    Living   1         SAB   1    IAB   0    Ectopic   0    Multiple   0    Live Births   1                Menarche age: N/A  Patient's last menstrual period was 10/11/2023 (approximate).       The following portions of the patient's history were reviewed and updated as appropriate: allergies, current medications, past family history, past medical history, past social history, past surgical history, and problem list.    Review of Systems  Pertinent items are noted in HPI.     Objective     /82 (BP Location: Left arm, Patient Position: Sitting, Cuff Size: Adult)   Ht 5' 4\" (1.626 m)   Wt 88.6 kg (195 lb 6.4 oz)   LMP 10/11/2023 (Approximate)   Breastfeeding No   BMI 33.54 kg/m²   Uterine Size: Below umbilicus                     FHT: 156                                                       "

## 2024-01-31 ENCOUNTER — OFFICE VISIT (OUTPATIENT)
Dept: FAMILY MEDICINE CLINIC | Facility: CLINIC | Age: 39
End: 2024-01-31
Payer: COMMERCIAL

## 2024-01-31 VITALS
SYSTOLIC BLOOD PRESSURE: 125 MMHG | BODY MASS INDEX: 33.8 KG/M2 | HEIGHT: 64 IN | WEIGHT: 198 LBS | OXYGEN SATURATION: 99 % | HEART RATE: 104 BPM | DIASTOLIC BLOOD PRESSURE: 80 MMHG

## 2024-01-31 DIAGNOSIS — R35.0 FREQUENCY OF URINATION: Primary | ICD-10-CM

## 2024-01-31 LAB
SL AMB  POCT GLUCOSE, UA: NORMAL
SL AMB LEUKOCYTE ESTERASE,UA: NORMAL
SL AMB POCT BILIRUBIN,UA: NORMAL
SL AMB POCT BLOOD,UA: NORMAL
SL AMB POCT CLARITY,UA: CLEAR
SL AMB POCT COLOR,UA: YELLOW
SL AMB POCT KETONES,UA: NORMAL
SL AMB POCT NITRITE,UA: NORMAL
SL AMB POCT PH,UA: 7
SL AMB POCT SPECIFIC GRAVITY,UA: 1.01
SL AMB POCT URINE PROTEIN: NORMAL
SL AMB POCT UROBILINOGEN: NORMAL

## 2024-01-31 PROCEDURE — 99213 OFFICE O/P EST LOW 20 MIN: CPT | Performed by: FAMILY MEDICINE

## 2024-01-31 PROCEDURE — 81003 URINALYSIS AUTO W/O SCOPE: CPT | Performed by: FAMILY MEDICINE

## 2024-01-31 NOTE — ASSESSMENT & PLAN NOTE
Increased frequency of urination and dysuria x 2 days.   Patient also reports a foul odor to the urine.  Urine dip in the office is totally normal.  I have advised patient to contact her gynecologist if any of her symptoms change or worsen.  She can certainly follow-up for another urine test if any of her urinary symptoms change.

## 2024-01-31 NOTE — PROGRESS NOTES
Subjective:      Patient ID: Cammy Mariee is a 38 y.o. female.    Urinary Frequency   Associated symptoms include frequency.       Patient is here reporting symptoms of increased urine frequency, burning and foul-smelling urine for the past 2 days.  Patient is currently 16 weeks pregnant.  Was evaluated 2 weeks ago by GYN for symptoms of vaginal spotting.  Patient has undergone a vaginal ultrasound for evaluation at the time.  Denies any symptoms of low back pain or abdominal pain.  Is not reporting any symptoms of fever or chills.    Past Medical History:   Diagnosis Date    Abnormal Pap smear of cervix     Colpo X1    Allergic     GERD (gastroesophageal reflux disease)     Hypertension     Miscarriage     4 years ago since 2022     Varicella     as a child       Family History   Problem Relation Age of Onset    Hypertension Mother     Diabetes Mother     Crohn's disease Mother     Diabetes Father     Thyroid disease Father     SHEREE disease Father     No Known Problems Sister     No Known Problems Sister     Sickle cell trait Son     Heart attack Maternal Grandmother     Heart disease Maternal Grandmother     Cancer Maternal Grandfather     Dementia Paternal Grandmother     Cancer Paternal Grandfather        Past Surgical History:   Procedure Laterality Date    COLPOSCOPY          reports that she has never smoked. She has never used smokeless tobacco. She reports that she does not currently use alcohol after a past usage of about 1.0 standard drink of alcohol per week. She reports that she does not use drugs.      Current Outpatient Medications:     aspirin 81 mg chewable tablet, Chew 81 mg daily, Disp: , Rfl:     omeprazole (PriLOSEC) 20 mg delayed release capsule, Take 1 capsule (20 mg total) by mouth daily, Disp: 90 capsule, Rfl: 0    Prenatal Vit-Fe Fumarate-FA (PRENATAL VITAMINS PO), Take by mouth, Disp: , Rfl:     The following portions of the patient's history were reviewed and updated as appropriate:  "allergies, current medications, past family history, past medical history, past social history, past surgical history and problem list.    Review of Systems   Gastrointestinal: Negative.    Genitourinary:  Positive for dysuria and frequency.           Objective:    /80   Pulse 104   Ht 5' 4\" (1.626 m)   Wt 89.8 kg (198 lb)   LMP 10/11/2023 (Approximate)   SpO2 99%   BMI 33.99 kg/m²      Physical Exam  Constitutional:       Appearance: Normal appearance.   Cardiovascular:      Heart sounds: Normal heart sounds.   Pulmonary:      Breath sounds: Normal breath sounds.   Abdominal:      Palpations: Abdomen is soft.      Tenderness: There is no right CVA tenderness or left CVA tenderness.      Comments: Gravid uterus.            Recent Results (from the past 1008 hour(s))   Hepatitis C antibody    Collection Time: 01/06/24 12:00 AM   Result Value Ref Range    HEP C AB NON-REACTIVE    Human Immunodeficiency Virus 1/2 Antigen / Antibody ( Fourth Generation) with Reflex Testing    Collection Time: 01/06/24 12:00 AM   Result Value Ref Range    HIV Screen Non-Reactive    ABO/Rh    Collection Time: 01/06/24 12:00 AM   Result Value Ref Range    ABO Grouping B     External Rh Factor Positive    POCT urine dip    Collection Time: 01/25/24 11:20 AM   Result Value Ref Range    LEUKOCYTE ESTERASE,UA trace     NITRITE,UA neg     POCT URINE PROTEIN trace     GLUCOSE, UA neg    POCT urine dip    Collection Time: 01/31/24  3:57 PM   Result Value Ref Range    LEUKOCYTE ESTERASE,UA neg     NITRITE,UA neg     SL AMB POCT UROBILINOGEN norm     POCT URINE PROTEIN neg      PH,UA 7     BLOOD,UA neg     SPECIFIC GRAVITY,UA 1.010     KETONES,UA neg     BILIRUBIN,UA neg     GLUCOSE, UA norm      COLOR,UA yellow     CLARITY,UA clear        Assessment/Plan:    No problem-specific Assessment & Plan notes found for this encounter.           Problem List Items Addressed This Visit          Other    Frequency of urination - Primary     " Increased frequency of urination and dysuria x 2 days.   Patient also reports a foul odor to the urine.  Urine dip in the office is totally normal.  I have advised patient to contact her gynecologist if any of her symptoms change or worsen.  She can certainly follow-up for another urine test if any of her urinary symptoms change.         Relevant Orders    POCT urine dip (Completed)

## 2024-02-27 ENCOUNTER — ROUTINE PRENATAL (OUTPATIENT)
Dept: OBGYN CLINIC | Facility: CLINIC | Age: 39
End: 2024-02-27
Payer: COMMERCIAL

## 2024-02-27 VITALS — DIASTOLIC BLOOD PRESSURE: 82 MMHG | WEIGHT: 202.4 LBS | SYSTOLIC BLOOD PRESSURE: 124 MMHG | BODY MASS INDEX: 34.74 KG/M2

## 2024-02-27 DIAGNOSIS — Z3A.19 19 WEEKS GESTATION OF PREGNANCY: Primary | ICD-10-CM

## 2024-02-27 LAB
SL AMB  POCT GLUCOSE, UA: NEGATIVE
SL AMB LEUKOCYTE ESTERASE,UA: NEGATIVE
SL AMB POCT NITRITE,UA: NEGATIVE
SL AMB POCT URINE PROTEIN: NEGATIVE

## 2024-02-27 PROCEDURE — 81002 URINALYSIS NONAUTO W/O SCOPE: CPT | Performed by: OBSTETRICS & GYNECOLOGY

## 2024-02-27 PROCEDURE — PNV: Performed by: OBSTETRICS & GYNECOLOGY

## 2024-02-27 NOTE — PROGRESS NOTES
Patient presents at 19 weeks 6 days gestation for routine prenatal exam.  She denies decreased fetal movement, loss of fluid, vaginal bleeding, contractions or pain.  She has completed her blood work.  Will see her back in 4 weeks or as needed.

## 2024-02-29 ENCOUNTER — OB ABSTRACT (OUTPATIENT)
Dept: OBGYN CLINIC | Facility: CLINIC | Age: 39
End: 2024-02-29

## 2024-02-29 ENCOUNTER — TELEPHONE (OUTPATIENT)
Dept: OBGYN CLINIC | Facility: CLINIC | Age: 39
End: 2024-02-29

## 2024-03-03 LAB
EXTERNAL HEMATOCRIT: 36.6 %
EXTERNAL HEMOGLOBIN: 11.9 G/DL
EXTERNAL PLATELET COUNT: 260 K/ÂΜL

## 2024-03-05 ENCOUNTER — TELEPHONE (OUTPATIENT)
Age: 39
End: 2024-03-05

## 2024-03-06 ENCOUNTER — ROUTINE PRENATAL (OUTPATIENT)
Facility: HOSPITAL | Age: 39
End: 2024-03-06
Payer: COMMERCIAL

## 2024-03-06 VITALS
SYSTOLIC BLOOD PRESSURE: 122 MMHG | WEIGHT: 202.6 LBS | DIASTOLIC BLOOD PRESSURE: 80 MMHG | HEART RATE: 77 BPM | HEIGHT: 64 IN | BODY MASS INDEX: 34.59 KG/M2

## 2024-03-06 DIAGNOSIS — Z36.86 ENCOUNTER FOR ANTENATAL SCREENING FOR CERVICAL LENGTH: ICD-10-CM

## 2024-03-06 DIAGNOSIS — Z3A.21 21 WEEKS GESTATION OF PREGNANCY: ICD-10-CM

## 2024-03-06 DIAGNOSIS — O09.522 ADVANCED MATERNAL AGE IN MULTIGRAVIDA, SECOND TRIMESTER: Primary | ICD-10-CM

## 2024-03-06 PROCEDURE — 99213 OFFICE O/P EST LOW 20 MIN: CPT | Performed by: OBSTETRICS & GYNECOLOGY

## 2024-03-06 PROCEDURE — 76811 OB US DETAILED SNGL FETUS: CPT | Performed by: OBSTETRICS & GYNECOLOGY

## 2024-03-06 PROCEDURE — 76817 TRANSVAGINAL US OBSTETRIC: CPT | Performed by: OBSTETRICS & GYNECOLOGY

## 2024-03-06 NOTE — PROGRESS NOTES
The patient was seen today for an ultrasound.  Please see ultrasound report (located under Ob Procedures) for additional details.   Thank you very much for allowing us to participate in the care of this very nice patient.  Should you have any questions, please do not hesitate to contact me.     Lyle Ramirez MD FACOG  Attending Physician, Maternal-Fetal Medicine  [facility]

## 2024-03-06 NOTE — PROGRESS NOTES
Ultrasound Probe Disinfection    A transvaginal ultrasound was performed.   Prior to use, disinfection was performed with High Level Disinfection Process ([employer]).  Probe serial number [id number]: [id number] was used.      Daniela Ramsay  03/06/24  10:36 AM

## 2024-03-27 ENCOUNTER — ROUTINE PRENATAL (OUTPATIENT)
Dept: OBGYN CLINIC | Facility: CLINIC | Age: 39
End: 2024-03-27
Payer: COMMERCIAL

## 2024-03-27 VITALS
HEIGHT: 64 IN | SYSTOLIC BLOOD PRESSURE: 134 MMHG | DIASTOLIC BLOOD PRESSURE: 80 MMHG | WEIGHT: 206.8 LBS | BODY MASS INDEX: 35.3 KG/M2

## 2024-03-27 DIAGNOSIS — Z36.9 ANTENATAL SCREENING ENCOUNTER: ICD-10-CM

## 2024-03-27 DIAGNOSIS — Z3A.24 24 WEEKS GESTATION OF PREGNANCY: Primary | ICD-10-CM

## 2024-03-27 DIAGNOSIS — O16.2 ELEVATED BLOOD PRESSURE AFFECTING PREGNANCY IN SECOND TRIMESTER, ANTEPARTUM: ICD-10-CM

## 2024-03-27 LAB
SL AMB  POCT GLUCOSE, UA: NORMAL
SL AMB LEUKOCYTE ESTERASE,UA: NORMAL
SL AMB POCT NITRITE,UA: NORMAL
SL AMB POCT URINE PROTEIN: NORMAL

## 2024-03-27 PROCEDURE — 81002 URINALYSIS NONAUTO W/O SCOPE: CPT | Performed by: PHYSICIAN ASSISTANT

## 2024-03-27 PROCEDURE — PNV: Performed by: PHYSICIAN ASSISTANT

## 2024-03-27 RX ORDER — CETIRIZINE HYDROCHLORIDE 10 MG/1
10 TABLET, CHEWABLE ORAL DAILY
COMMUNITY

## 2024-03-27 NOTE — PROGRESS NOTES
Assessment     Pregnancy 24 and 0/7 weeks     Plan     Routine OB visit doing well overall.  Patient's acid reflux/heartburn well-controlled with GERD.  AMA, history of gestational hypertension.  She is feeling well with no new symptoms or concerns.    Patient has follow-up with [facility] for growth due to AMA on 5/29.    Blood pressure today 134/80.  Recheck 134/86.  She is asymptomatic.  Urine dip negative for protein.  She has history of gestational hypertension with previous pregnancy.  I would advise preeclamptic labs, as well as to start monitoring her blood pressures at home twice a day and keep a log.  She is to call sooner if blood pressures greater than 140/90 or symptomatic such as severe headache, blurry vision, dizziness or sudden onset upper abdominal pain/swelling.  She is agreeable to this.     with good fetal movement appreciated.  B+ blood type.  Urine dip unremarkable    28-week labs discussed and ordered.  She was encouraged to complete CBC, CMP, uric acid, urine protein creatinine ratio soon as possible.  Continue PNV daily.  Continue ASA daily, DC at 36 weeks.  Stressed importance of well-balanced diet, staying well-hydrated with plenty of water, exercise as tolerated, low-sodium diet.    Routine OB visit already scheduled for 4 weeks.  Would appreciate sooner follow-up in 2 weeks for BP check.      Chief Complaint   Patient presents with    Routine Prenatal Visit     Pt feeling well, denies any VB or LOF, having good fetal movements         Subjective     Cammy Mariee is a 38 y.o. female being seen today for her obstetrical visit. She is at 24w0d gestation.  She is feeling well overall and reports no symptoms or concerns today.    Patient reports no bleeding, no contractions, no cramping, and no leaking.  She denies significant headache, blurry vision, dizziness/lightheadedness, swelling, abdominal pain, nausea/vomiting.  Acid reflux stable on Prilosec.    Fetal movement: normal.  She is  "taking Zyrtec for allergies.  She is taking daily aspirin and PNV    Menstrual History:  OB History          3    Para   1    Term   1            AB   1    Living   1         SAB   1    IAB   0    Ectopic   0    Multiple   0    Live Births   1                Menarche age: N/A  Patient's last menstrual period was 10/11/2023 (approximate).       The following portions of the patient's history were reviewed and updated as appropriate: allergies, current medications, past family history, past medical history, past social history, past surgical history, and problem list.    Review of Systems  Pertinent items are noted in HPI.     Objective      /80 (BP Location: Left arm, Patient Position: Sitting, Cuff Size: Adult)   Ht 5' 4\" (1.626 m)   Wt 93.8 kg (206 lb 12.8 oz)   LMP 10/11/2023 (Approximate)   BMI 35.50 kg/m²   FHT: 148 BPM   Uterine Size: size equals dates            "

## 2024-03-29 ENCOUNTER — TELEPHONE (OUTPATIENT)
Facility: HOSPITAL | Age: 39
End: 2024-03-29

## 2024-03-29 NOTE — TELEPHONE ENCOUNTER
Left voicemail informing patient of the time change to her upcoming appointment on 5/29. Due to a schedule change we had to push her appointment up to 9:45 Am from 10 Am. Appointment is now 5/29 at 9:45 AM. Requested she give our office a call back at [phone] with any questions or if she would need to reschedule.

## 2024-03-31 LAB
ALBUMIN SERPL-MCNC: 3.4 G/DL (ref 3.6–5.1)
ALBUMIN/GLOB SERPL: 1.4 (CALC) (ref 1–2.5)
ALP SERPL-CCNC: 55 U/L (ref 31–125)
ALT SERPL-CCNC: 14 U/L (ref 6–29)
AST SERPL-CCNC: 15 U/L (ref 10–30)
BASOPHILS # BLD AUTO: 26 CELLS/UL (ref 0–200)
BASOPHILS NFR BLD AUTO: 0.3 %
BILIRUB SERPL-MCNC: 0.3 MG/DL (ref 0.2–1.2)
BUN SERPL-MCNC: 12 MG/DL (ref 7–25)
BUN/CREAT SERPL: ABNORMAL (CALC) (ref 6–22)
CALCIUM SERPL-MCNC: 8.3 MG/DL (ref 8.6–10.2)
CHLORIDE SERPL-SCNC: 104 MMOL/L (ref 98–110)
CO2 SERPL-SCNC: 25 MMOL/L (ref 20–32)
CREAT SERPL-MCNC: 0.58 MG/DL (ref 0.5–0.97)
CREAT UR-MCNC: 10 MG/DL (ref 20–275)
EOSINOPHIL # BLD AUTO: 158 CELLS/UL (ref 15–500)
EOSINOPHIL NFR BLD AUTO: 1.8 %
ERYTHROCYTE [DISTWIDTH] IN BLOOD BY AUTOMATED COUNT: 12.5 % (ref 11–15)
GFR/BSA.PRED SERPLBLD CYS-BASED-ARV: 119 ML/MIN/1.73M2
GLOBULIN SER CALC-MCNC: 2.5 G/DL (CALC) (ref 1.9–3.7)
GLUCOSE SERPL-MCNC: 79 MG/DL (ref 65–99)
HCT VFR BLD AUTO: 36.6 % (ref 35–45)
HGB BLD-MCNC: 11.9 G/DL (ref 11.7–15.5)
LYMPHOCYTES # BLD AUTO: 1329 CELLS/UL (ref 850–3900)
LYMPHOCYTES NFR BLD AUTO: 15.1 %
MCH RBC QN AUTO: 27.7 PG (ref 27–33)
MCHC RBC AUTO-ENTMCNC: 32.5 G/DL (ref 32–36)
MCV RBC AUTO: 85.1 FL (ref 80–100)
MONOCYTES # BLD AUTO: 642 CELLS/UL (ref 200–950)
MONOCYTES NFR BLD AUTO: 7.3 %
NEUTROPHILS # BLD AUTO: 6644 CELLS/UL (ref 1500–7800)
NEUTROPHILS NFR BLD AUTO: 75.5 %
PLATELET # BLD AUTO: 260 THOUSAND/UL (ref 140–400)
PMV BLD REES-ECKER: 9.4 FL (ref 7.5–12.5)
POTASSIUM SERPL-SCNC: 4.1 MMOL/L (ref 3.5–5.3)
PROT SERPL-MCNC: 5.9 G/DL (ref 6.1–8.1)
PROT UR-MCNC: <4 MG/DL (ref 5–24)
PROT/CREAT UR: ABNORMAL MG/G CREAT (ref 24–184)
PROT/CREAT UR: ABNORMAL MG/MG CREAT (ref 0.02–0.18)
RBC # BLD AUTO: 4.3 MILLION/UL (ref 3.8–5.1)
SODIUM SERPL-SCNC: 135 MMOL/L (ref 135–146)
URATE SERPL-MCNC: 3.3 MG/DL (ref 2.5–7)
WBC # BLD AUTO: 8.8 THOUSAND/UL (ref 3.8–10.8)

## 2024-04-16 LAB
GLUCOSE 1H P 50 G GLC PO SERPL-MCNC: 128 MG/DL
TSH SERPL-ACNC: 1.79 MIU/L

## 2024-04-18 ENCOUNTER — ROUTINE PRENATAL (OUTPATIENT)
Dept: OBGYN CLINIC | Facility: CLINIC | Age: 39
End: 2024-04-18
Payer: COMMERCIAL

## 2024-04-18 VITALS
HEIGHT: 64 IN | WEIGHT: 210.2 LBS | SYSTOLIC BLOOD PRESSURE: 122 MMHG | DIASTOLIC BLOOD PRESSURE: 84 MMHG | BODY MASS INDEX: 35.89 KG/M2

## 2024-04-18 DIAGNOSIS — Z3A.27 27 WEEKS GESTATION OF PREGNANCY: Primary | ICD-10-CM

## 2024-04-18 PROCEDURE — 81002 URINALYSIS NONAUTO W/O SCOPE: CPT | Performed by: PHYSICIAN ASSISTANT

## 2024-04-18 PROCEDURE — PNV: Performed by: PHYSICIAN ASSISTANT

## 2024-04-18 NOTE — PROGRESS NOTES
Assessment     Pregnancy 27 and 1/7 weeks     Plan     OB visit, sooner follow-up to keep close eye on blood pressure.  Asymptomatic.  Blood pressures at home ranging from 112 - 139/77-80's, with no systolic readings greater than 140 and 2 occasion of diastolic 90, nothing higher.  Preeclamptic labs unremarkable.  Blood pressure today 122/84 which is reassuring.  At this time we will continue having patient to monitor blood pressures at home and if blood pressures become more consistently near or above 140/90 we will consider starting patient on blood pressure medication.    , good fetal movement appreciated.  Urine dip trace leuks otherwise unremarkable.    Patient did pass 1 hour GTT, normal CBC  Lab did not collect for RPR screen, this was reprinted and provided to patient today to complete at her earliest convenience.  Next [facility] follow-up 5/29    Stressed importance of staying well-hydrated, well-balanced diet, exercise as tolerated  Continue PNV, ASA daily  Continue omeprazole daily reflux stable    Will keep previously scheduled appointment 4/23 with Dr. Alexandre  At 28 weeks will need counseling regarding kick counts, Tdap vaccine and yellow folder.  She was encouraged to call sooner with any concern regarding increase in blood pressure; signs/symptoms preeclampsia also reviewed with patient to include severe more persistent headaches, blurry vision, persistent dizziness, sudden onset swelling, abdominal pain.  Also instructed to call if blood pressures prior to next appointment consistently greater than 140/90.    Chief Complaint   Patient presents with    Follow-up     Pt is here for follow up on BP. Pt states she has been having headaches every now and then. Pt states she had an episode of dizziness last night. Pt denies swelling of hands and feet, VB and LOF. Pt states she is having good fetal movement.           Subjective     Cammy Mariee is a 38 y.o. female being seen today for her  "obstetrical visit -blood pressure check.  History of gestational hypertension near the end of prior pregnancy.  She is at 27w1d gestation.  Occasional, infrequent mild headache with no other symptoms.  Reported some fatigue last night and felt short spurts of some dizziness that spontaneously resolved when she sat down.  She otherwise is doing well overall and reports no concerns today.  Patient reports no bleeding, no contractions, no cramping, and no leaking.  Denies blurry vision, abdominal pain, nausea/vomiting, reflux or swelling.  Normal urination and bowel movement.  Fetal movement: normal.        Menstrual History:  OB History          3    Para   1    Term   1            AB   1    Living   1         SAB   1    IAB   0    Ectopic   0    Multiple   0    Live Births   1                Menarche age: N/A  Patient's last menstrual period was 10/11/2023 (approximate).       The following portions of the patient's history were reviewed and updated as appropriate: allergies, current medications, past family history, past medical history, past social history, past surgical history, and problem list.    Review of Systems  Pertinent items are noted in HPI.     Objective      /84 (BP Location: Left arm, Patient Position: Sitting, Cuff Size: Adult)   Ht 5' 4\" (1.626 m)   Wt 95.3 kg (210 lb 3.2 oz)   LMP 10/11/2023 (Approximate)   BMI 36.08 kg/m²   FHT: 150 BPM   Uterine Size: 27 cm and size equals dates            "

## 2024-04-19 LAB
EXTERNAL SYPHILIS TOTAL IGG/IGM SCREENING: NEGATIVE
RPR SER QL: NORMAL

## 2024-04-23 ENCOUNTER — ROUTINE PRENATAL (OUTPATIENT)
Dept: OBGYN CLINIC | Facility: CLINIC | Age: 39
End: 2024-04-23
Payer: COMMERCIAL

## 2024-04-23 VITALS — SYSTOLIC BLOOD PRESSURE: 136 MMHG | DIASTOLIC BLOOD PRESSURE: 86 MMHG | BODY MASS INDEX: 35.53 KG/M2 | WEIGHT: 207 LBS

## 2024-04-23 DIAGNOSIS — Z3A.27 27 WEEKS GESTATION OF PREGNANCY: Primary | ICD-10-CM

## 2024-04-23 DIAGNOSIS — Z23 ENCOUNTER FOR IMMUNIZATION: ICD-10-CM

## 2024-04-23 LAB
SL AMB  POCT GLUCOSE, UA: NEGATIVE
SL AMB LEUKOCYTE ESTERASE,UA: NEGATIVE
SL AMB POCT CLARITY,UA: ABNORMAL
SL AMB POCT COLOR,UA: YELLOW
SL AMB POCT NITRITE,UA: POSITIVE
SL AMB POCT URINE PROTEIN: NEGATIVE

## 2024-04-23 PROCEDURE — 90471 IMMUNIZATION ADMIN: CPT | Performed by: OBSTETRICS & GYNECOLOGY

## 2024-04-23 PROCEDURE — PNV: Performed by: OBSTETRICS & GYNECOLOGY

## 2024-04-23 PROCEDURE — 81003 URINALYSIS AUTO W/O SCOPE: CPT | Performed by: OBSTETRICS & GYNECOLOGY

## 2024-04-23 PROCEDURE — 90715 TDAP VACCINE 7 YRS/> IM: CPT | Performed by: OBSTETRICS & GYNECOLOGY

## 2024-04-23 NOTE — PROGRESS NOTES
Cammy Mariee is [id number], here for her routine ob appt; pt deies any LOF, VB/ CTXs.  Yellow folder given today; Tdap to be given today, breast pump ordered.     +FM:  yes

## 2024-04-23 NOTE — PROGRESS NOTES
The patient presents at 27 weeks 6 days gestation for routine prenatal exam.  She denies decreased fetal movement, loss of fluid, vaginal bleeding, contractions or pain.  She was given Tdap today.  Her 28-week blood work was within the normal limits.  We will see her back in 3 weeks or as needed.

## 2024-04-24 LAB
DME PARACHUTE DELIVERY DATE ACTUAL: NORMAL
DME PARACHUTE DELIVERY DATE REQUESTED: NORMAL
DME PARACHUTE ITEM DESCRIPTION: NORMAL
DME PARACHUTE ORDER STATUS: NORMAL
DME PARACHUTE SUPPLIER NAME: NORMAL
DME PARACHUTE SUPPLIER PHONE: NORMAL

## 2024-05-02 ENCOUNTER — OB ABSTRACT (OUTPATIENT)
Dept: OBGYN CLINIC | Facility: CLINIC | Age: 39
End: 2024-05-02

## 2024-05-15 ENCOUNTER — ROUTINE PRENATAL (OUTPATIENT)
Dept: OBGYN CLINIC | Facility: CLINIC | Age: 39
End: 2024-05-15
Payer: COMMERCIAL

## 2024-05-15 VITALS
DIASTOLIC BLOOD PRESSURE: 92 MMHG | BODY MASS INDEX: 36.84 KG/M2 | SYSTOLIC BLOOD PRESSURE: 138 MMHG | WEIGHT: 215.8 LBS | HEIGHT: 64 IN

## 2024-05-15 DIAGNOSIS — Z3A.31 31 WEEKS GESTATION OF PREGNANCY: Primary | ICD-10-CM

## 2024-05-15 DIAGNOSIS — O13.3 GESTATIONAL HYPERTENSION, THIRD TRIMESTER: ICD-10-CM

## 2024-05-15 PROCEDURE — PNV: Performed by: PHYSICIAN ASSISTANT

## 2024-05-15 PROCEDURE — 81002 URINALYSIS NONAUTO W/O SCOPE: CPT | Performed by: PHYSICIAN ASSISTANT

## 2024-05-15 RX ORDER — LABETALOL 100 MG/1
100 TABLET, FILM COATED ORAL 2 TIMES DAILY
Qty: 60 TABLET | Refills: 3 | Status: SHIPPED | OUTPATIENT
Start: 2024-05-15

## 2024-05-15 NOTE — PROGRESS NOTES
Assessment     Pregnancy 31 and 0/7 weeks     Plan    Routine OB visit feeling well, AMA, history of gestational hypertension.    Patient reports asymptomatic but concerned about blood pressures more consistently on the elevated side 130s-140s/80s to 90s.  She states that when she rechecks it it generally goes down but only slightly.  She did note 2 episodes of short-lived tachycardia and palpitation symptoms about 4 days ago with heart rate 120 per her Apple Watch but reports that her Apple Watch read ECG that was normal.  She denies any associated symptoms of chest pain, significant shortness of breath, headache, blurry vision, dizziness, lightheadedness.  Regular rate and rhythm today though blood pressure 138/92 with recheck 136/92.  Will order preeclamptic labs to compare to previous.  Will start patient on labetalol 100 mg twice daily for likely gestational hypertension.  Will have patient check blood pressures at least twice daily with goal consistently less than 140/90.  Patient will monitor for any further episodes of rapid heartbeat/palpitation, can consider referral to cardiology, Holter monitor if needed.  Of note, patient had thyroid blood work done last month which was normal.  Patient expresses understanding of recommendations and agrees to plan.     28-week labs reviewed, normal     with good fetal movement appreciated.  B+ blood type  Normal urine today    And has already received yellow folder as well as Tdap administered this pregnancy  Stressed importance of kick counts  Patient reminded of [facility] follow-up ultrasound 5/29 and will likely at that time recommend closer fetal surveillance.  Patient to continue PNV, ASA daily  Omeprazole for reflux    Low-sodium diet, activity as tolerated  Stressed importance of staying well-hydrated drinking plenty water, well-balanced diet.      Signs/symptoms preeclampsia reviewed with patient today.  Follow up in 2 Weeks.    Patient encouraged to call  sooner if any problems in the interim      Chief Complaint   Patient presents with    Routine Prenatal Visit     Patient states BP at home has been in 130s/90. Patient states has been having episodes of higher heart rate in resting state. Patient states HR and BP does lower after sitting down/deep breathing. Patient denies and headaches, blurred vision or epigastric pain.         Subjective     Cammy Mariee is a 38 y.o. female being seen today for her obstetrical visit. She is at 31w0d gestation.  She is feeling well overall and is completely asymptomatic but reporting concern regarding elevated blood pressures.    Patient reports no bleeding, no contractions, no cramping, no leaking, and denies headache, blurry vision, dizziness/lightheadedness, chest pain or shortness of breath, abdominal pain, swelling, nausea/vomiting.  Reflux stable. . Fetal movement: normal.    States BPs at home are generally 130s to 140s over 80s-90s.  She states previously blood pressures were sometimes in the 110s to 120s send seem to be slowly creeping up.  States that they usually go down slightly with recheck but not much.    States over weekend had 2 episodes of racing heartbeat and occurred 1-2 min after doing stairs. She denies CP, SOB, HA', dizziness, blurred vision or lightheadedness. States HR on apple watch 120 and resolved after sitting and deep breaths. States apple watch did ECG and was normal.     Menstrual History:  OB History          3    Para   1    Term   1            AB   1    Living   1         SAB   1    IAB   0    Ectopic   0    Multiple   0    Live Births   1                Menarche age: N/A  Patient's last menstrual period was 10/11/2023 (approximate).       The following portions of the patient's history were reviewed and updated as appropriate: allergies, current medications, past family history, past medical history, past social history, past surgical history, and problem list.    Review of  "Systems  Pertinent items are noted in HPI.     Objective     /92 (BP Location: Left arm, Patient Position: Sitting, Cuff Size: Adult)   Ht 5' 4\" (1.626 m)   Wt 97.9 kg (215 lb 12.8 oz)   LMP 10/11/2023 (Approximate)   BMI 37.04 kg/m²   FHT:  147 BPM   Uterine Size: 31 cm and size equals dates   Presentation: unsure              "

## 2024-05-21 ENCOUNTER — NURSE TRIAGE (OUTPATIENT)
Age: 39
End: 2024-05-21

## 2024-05-21 ENCOUNTER — HOSPITAL ENCOUNTER (OUTPATIENT)
Facility: HOSPITAL | Age: 39
Discharge: HOME | End: 2024-05-21
Attending: OBSTETRICS & GYNECOLOGY | Admitting: OBSTETRICS & GYNECOLOGY
Payer: COMMERCIAL

## 2024-05-21 VITALS
DIASTOLIC BLOOD PRESSURE: 83 MMHG | TEMPERATURE: 98 F | RESPIRATION RATE: 16 BRPM | SYSTOLIC BLOOD PRESSURE: 131 MMHG | HEART RATE: 90 BPM

## 2024-05-21 DIAGNOSIS — D50.9 IRON (FE) DEFICIENCY ANEMIA: Primary | ICD-10-CM

## 2024-05-21 DIAGNOSIS — O46.90 VAGINAL BLEEDING DURING PREGNANCY, ANTEPARTUM: ICD-10-CM

## 2024-05-21 LAB
APTT PPP: 25 SECONDS (ref 23–37)
BASOPHILS # BLD AUTO: 0.04 THOUSANDS/ÂΜL (ref 0–0.1)
BASOPHILS NFR BLD AUTO: 0 % (ref 0–1)
EOSINOPHIL # BLD AUTO: 0.17 THOUSAND/ÂΜL (ref 0–0.61)
EOSINOPHIL NFR BLD AUTO: 2 % (ref 0–6)
ERYTHROCYTE [DISTWIDTH] IN BLOOD BY AUTOMATED COUNT: 13.2 % (ref 11.6–15.1)
FETAL RBC/1000 RBC BLD KLEIH BETKE-RTO: 0 % (ref 0–1)
FIBRINOGEN PPP-MCNC: 473 MG/DL (ref 207–520)
HCT VFR BLD AUTO: 30.3 % (ref 34.8–46.1)
HGB BLD-MCNC: 10 G/DL (ref 11.5–15.4)
IMM GRANULOCYTES # BLD AUTO: 0.1 THOUSAND/UL (ref 0–0.2)
IMM GRANULOCYTES NFR BLD AUTO: 1 % (ref 0–2)
INR PPP: 0.95 (ref 0.84–1.19)
LYMPHOCYTES # BLD AUTO: 1.26 THOUSANDS/ÂΜL (ref 0.6–4.47)
LYMPHOCYTES NFR BLD AUTO: 14 % (ref 14–44)
MCH RBC QN AUTO: 27.3 PG (ref 26.8–34.3)
MCHC RBC AUTO-ENTMCNC: 33 G/DL (ref 31.4–37.4)
MCV RBC AUTO: 83 FL (ref 82–98)
MONOCYTES # BLD AUTO: 0.62 THOUSAND/ÂΜL (ref 0.17–1.22)
MONOCYTES NFR BLD AUTO: 7 % (ref 4–12)
NEUTROPHILS # BLD AUTO: 6.94 THOUSANDS/ÂΜL (ref 1.85–7.62)
NEUTS SEG NFR BLD AUTO: 76 % (ref 43–75)
NRBC BLD AUTO-RTO: 0 /100 WBCS
PLATELET # BLD AUTO: 209 THOUSANDS/UL (ref 149–390)
PMV BLD AUTO: 9.1 FL (ref 8.9–12.7)
PROTHROMBIN TIME: 13.3 SECONDS (ref 11.6–14.5)
RBC # BLD AUTO: 3.66 MILLION/UL (ref 3.81–5.12)
WBC # BLD AUTO: 9.13 THOUSAND/UL (ref 4.31–10.16)

## 2024-05-21 PROCEDURE — 85384 FIBRINOGEN ACTIVITY: CPT

## 2024-05-21 PROCEDURE — 85610 PROTHROMBIN TIME: CPT

## 2024-05-21 PROCEDURE — 59025 FETAL NON-STRESS TEST: CPT | Performed by: OBSTETRICS & GYNECOLOGY

## 2024-05-21 PROCEDURE — 85025 COMPLETE CBC W/AUTO DIFF WBC: CPT

## 2024-05-21 PROCEDURE — 99214 OFFICE O/P EST MOD 30 MIN: CPT

## 2024-05-21 PROCEDURE — 85730 THROMBOPLASTIN TIME PARTIAL: CPT

## 2024-05-21 PROCEDURE — 85460 HEMOGLOBIN FETAL: CPT

## 2024-05-21 RX ORDER — FERROUS SULFATE 324(65)MG
324 TABLET, DELAYED RELEASE (ENTERIC COATED) ORAL
Qty: 30 TABLET | Refills: 2 | Status: SHIPPED | OUTPATIENT
Start: 2024-05-21

## 2024-05-21 RX ADMIN — SODIUM CHLORIDE, SODIUM LACTATE, POTASSIUM CHLORIDE, AND CALCIUM CHLORIDE 1000 ML: .6; .31; .03; .02 INJECTION, SOLUTION INTRAVENOUS at 10:20

## 2024-05-21 NOTE — PROGRESS NOTES
L&D Triage Note - OB/GYN  Cammy Mariee 38 y.o. female MRN: [id number]  Unit/Bed#:  TRIAGE 2 Encounter: [id number]      ASSESSMENT:    Cammy Mariee is a 38 y.o.  at 31w6d presenting after voiding several dark blood clots this morning. On speculum there is no active bleeding from cervical os. Patient is having contractions on monitor but not feeling them. Abruption labs were WNL and IVF fluids given.     PLAN:    1) Rule Out Abruption  - Microscopy negative   - No active bleeding noted on exam  - Abruption Labs WNL  - IVF administered     2) Iron Deficiency Anemia  - Take Iron tablets in morning with Vitamin C    3) Discharge from OB triage with  labor precautions    - Reviewed rupture of membranes, false vs true labor, decreased fetal movement, and vaginal bleeding   - Pt to call provider with any concerns and follow up at her next scheduled prenatal appointment    - Continue routine prenatal care   - Case discussed with Dr. Grant     SUBJECTIVE:    Cammy Mariee 38 y.o.  at 31w6d with an Estimated Date of Delivery: 24 presenting after voiding several lark dark brown blood clots this morning. Patient states she noticed the toilet bowl changed color and stated it took her 3 wipes until she noticed no more blood. She states the toilet paper had bright red blood on it. She denies any recent sexual intercourse nor trauma. She denies any leakage of fluid, contractions nor prior spotting to today. She endorses good fetal movement. She states this has never happened prior.     Her current obstetrical history is significant for GHTN    Her past obstetrical history is significant for Miscarriage x1    Contractions: None  Leakage of fluid: None  Vaginal Bleeding: Clotting this morning  Fetal movement: present    OBJECTIVE:    Vitals:    24 0929   BP: 131/83   Pulse: 90   Resp:    Temp:        ROS:  Constitutional: Negative  Respiratory: Negative  Cardiovascular: Negative     Gastrointestinal: Negative    General Physical Exam:  General: in no apparent distress, well developed and well nourished, alert, and oriented times 3  Cardiovascular: intact distals pulses Cor RRR  Lungs: non-labored breathing  Abdomen: Gravid, soft, non-tender   Lower extremeties: nontender, b/l Stephania's sign negative    Cervical Exam  Speculum: Cervical os is closed. Pink tinge discharge, no active bleeding appreciated. No lesions appreciated on cervix nor in vagina  SVE: 0 / 0% / -4 per Dr. Higeura     Fetal monitoring:  FHT:  150 bpm/ Moderate 6 - 25 bpm / 10 x 10 accelerations present, no decelerations  [place]: contractions noted    BPP: 10/10    KOH/WTMT:     Infection:   - Negative clue cells    - Negative hyphae   - Negative trichomonads present    Membrane status   - Negative ferning   - Negative pooling       Imaging:        Abd. US   DYLAN      - Q1 6.43cm     - Q2 5.76cm     - Q3 2.70cm     - Q4 2.70cm     - Total: 17.59cm   Placenta: Anterior   Presentation: Head in RUQ      Luis Yeung MD  OBGYN PGY-1  5/21/2024 12:04 PM

## 2024-05-21 NOTE — TELEPHONE ENCOUNTER
"Patient is calling in, she reports that this AM she went to use the bathroom. She felt like her underwear was wet and when she went pee she noticed dark red blood in the toilet. When she wiped she states she had a lot of dime size clots on the toilet paper. By the third wipe it was gone. Right now her bleeding has stopped. She denies any pain or lower pelvic cramping. She denies any LOF. She reports that baby is moving well this AM. Recommend patient to be seen in triage    TT- on call provider  TT-Charge RN      Reason for Disposition   MILD-MODERATE vaginal bleeding (e.g., small to medium clots; like mild menstrual period) (Exception: Single episode after sexual intercourse or pelvic exam)    Answer Assessment - Initial Assessment Questions  1. ONSET: \"When did this bleeding start?\"         720 AM  2. DESCRIPTION: \"Describe the bleeding that you are having.\" \"How much bleeding is there?\"     - SPOTTING: spotting, or pinkish / brownish mucous discharge; does not fill panti-liner or pad     - MILD:  less than 1 pad / hour; less than patient's usual menstrual bleeding    - MODERATE: 1-2 pads / hour; 1 menstrual cup every 6 hours; small-medium blood clots (e.g., pea, grape, small coin)    - SEVERE: soaking 2 or more pads/hour for 2 or more hours; 1 menstrual cup every 2 hours; bleeding not contained by pads or continuous red blood from vagina; large blood clots (e.g., golf ball, large coin)       Dark red jelly looking bleeding, turning to light pink and has now stopped. She noticed a couple clots in toiler the size of dimes, she there was a lot on the toilet paper  3. ABDOMINAL PAIN SEVERITY: If present, ask: \"How bad is it?\"  (e.g., Scale 1-10; mild, moderate, or severe)    - MILD (1-3): doesn't interfere with normal activities, abdomen soft and not tender to touch     - MODERATE (4-7): interferes with normal activities or awakens from sleep, tender to touch     - SEVERE (8-10): excruciating pain, doubled over, " "unable to do any normal activities      Denies any pain  4. PREGNANCY: \"Do you know how many weeks or months pregnant you are?\"       31w6d  5. ZAIRA: \"What date are you expecting to deliver?\"      7/17/2024  6. FETAL MOVEMENT: \"Has the baby's movement decreased or changed significantly from normal?\"      No changes, felt baby move this AM  8. OTHER SYMPTOMS: \"What other symptoms are you having with the bleeding?\" (e.g., leaking fluid from vagina, contractions)    Protocols used: Pregnancy - Vaginal Bleeding Greater Than 20 Weeks EGA-ADULT-OH    "

## 2024-05-22 ENCOUNTER — TELEPHONE (OUTPATIENT)
Dept: OBGYN CLINIC | Facility: CLINIC | Age: 39
End: 2024-05-22

## 2024-05-22 NOTE — TELEPHONE ENCOUNTER
.Overall how are you feeling? Fine, was Bernard yesterday     Compliant with routine OB appointments? yes    Have you completed your 3rd trimester lab work? yes    Have you reviewed the contents of 3rd trimester folder from office?    Have you decided on a pediatrician? Yes    If yes, who [facility] brooklyn Smith    If no, what are you looking for and request sent for outreach.   Questions on paperwork to go back to office? no   Questions on the baby birth certificate forms? no    EPDS [place] 1    Send link for the Hospital Readiness Video via [employer]

## 2024-05-27 NOTE — PROGRESS NOTES
Please refer to the [facility] ultrasound report in Ob Procedures for additional information regarding today's visit

## 2024-05-29 ENCOUNTER — ULTRASOUND (OUTPATIENT)
Facility: HOSPITAL | Age: 39
End: 2024-05-29
Payer: COMMERCIAL

## 2024-05-29 ENCOUNTER — DOCUMENTATION (OUTPATIENT)
Facility: HOSPITAL | Age: 39
End: 2024-05-29

## 2024-05-29 VITALS
HEART RATE: 89 BPM | WEIGHT: 219 LBS | DIASTOLIC BLOOD PRESSURE: 76 MMHG | BODY MASS INDEX: 37.39 KG/M2 | HEIGHT: 64 IN | SYSTOLIC BLOOD PRESSURE: 132 MMHG

## 2024-05-29 DIAGNOSIS — O09.523 ELDERLY MULTIGRAVIDA, THIRD TRIMESTER: Primary | ICD-10-CM

## 2024-05-29 DIAGNOSIS — Z3A.33 33 WEEKS GESTATION OF PREGNANCY: ICD-10-CM

## 2024-05-29 DIAGNOSIS — O13.3 GESTATIONAL HYPERTENSION, THIRD TRIMESTER: ICD-10-CM

## 2024-05-29 DIAGNOSIS — O36.63X0 LARGE FOR GESTATIONAL AGE FETUS AFFECTING MOTHER, ANTEPARTUM, THIRD TRIMESTER, SINGLE GESTATION: ICD-10-CM

## 2024-05-29 PROCEDURE — 76818 FETAL BIOPHYS PROFILE W/NST: CPT | Performed by: OBSTETRICS & GYNECOLOGY

## 2024-05-29 PROCEDURE — 76816 OB US FOLLOW-UP PER FETUS: CPT | Performed by: OBSTETRICS & GYNECOLOGY

## 2024-05-29 PROCEDURE — 99214 OFFICE O/P EST MOD 30 MIN: CPT | Performed by: OBSTETRICS & GYNECOLOGY

## 2024-05-29 NOTE — LETTER
May 29, 2024     Loly Alexandre MD  [address]  [address]  [place] [id number]    Patient: Cammy Mariee   YOB: 1985   Date of Visit: 5/29/2024       Dear Dr. Alexandre:    Thank you for referring Cammy Mariee to me for evaluation. Below are my notes for this consultation.    If you have questions, please do not hesitate to call me. I look forward to following your patient along with you.         Sincerely,        Ej Galvez MD        CC: No Recipients    Ej Galvez MD  5/29/2024  6:02 PM  Sign when Signing Visit  Please refer to the [facility] ultrasound report in Ob Procedures for additional information regarding today's visit

## 2024-05-29 NOTE — PROGRESS NOTES
Non-Stress Testing:    Non-Stress test, equipment, procedure, and expected outcomes explained. Reviewed fetal kick counts and when to call OB.Verified patient understanding of fetal kick counts with teach back method. Patient reports feeling daily fetal movements. Patient has no questions or concerns.     DR Galvez viewed nonstress test prior to completion of appointment.

## 2024-05-30 ENCOUNTER — TELEPHONE (OUTPATIENT)
Age: 39
End: 2024-05-30

## 2024-05-30 ENCOUNTER — ROUTINE PRENATAL (OUTPATIENT)
Dept: OBGYN CLINIC | Facility: CLINIC | Age: 39
End: 2024-05-30
Payer: COMMERCIAL

## 2024-05-30 VITALS
SYSTOLIC BLOOD PRESSURE: 122 MMHG | BODY MASS INDEX: 37.25 KG/M2 | HEIGHT: 64 IN | WEIGHT: 218.2 LBS | DIASTOLIC BLOOD PRESSURE: 80 MMHG

## 2024-05-30 DIAGNOSIS — Z3A.33 33 WEEKS GESTATION OF PREGNANCY: Primary | ICD-10-CM

## 2024-05-30 PROCEDURE — 81002 URINALYSIS NONAUTO W/O SCOPE: CPT | Performed by: PHYSICIAN ASSISTANT

## 2024-05-30 PROCEDURE — PNV: Performed by: PHYSICIAN ASSISTANT

## 2024-05-30 NOTE — PROGRESS NOTES
Assessment     Pregnancy 33 and 1/7 weeks     Plan    Routine OB visit doing well overall third trimester.    Was seen in labor and delivery 5/21 for some vaginal bleeding with negative workup reassuring.  She did have follow-up with maternal-fetal medicine yesterday for indication obesity, AMA, gestational hypertension and interval growth assessment.  Abdominal circumference measuring 90th percentile for gestational age and overall estimated fetal weight placed at 92nd percentile.  They recommend twice a week fetal surveillance NST and DYLAN for indication of gestational hypertension.  Interval growth in 3 weeks scheduled for 6/21.  Delivery is recommended at 37 0/7 weeks gestation for indication of gestational hypertension.    Patient's blood pressure today 122/80 and notes 120s over 70s at home much better than they have been.  She will continue checking blood pressures regularly and taking labetalol 100 mg twice daily.  Patient to call with any blood pressures consistently greater than 140/90 or symptomatic.  Signs/symptoms preeclampsia reviewed.  Continue ASA until 36 weeks.     with good fetal movement appreciated.  Normal urine dip  B+ blood type    Patient to continue PNV daily  Stressed importance of staying well-hydrated drinking plenty of water, well-balanced diet, exercise as tolerated.  Stressed importance of kick counts and monitoring fetal movement at home.  Patient up-to-date with Tdap and labs this pregnancy.  Yellow folder with contents already provided at previous visit.    RTO in 2 weeks with me.  Pt has appt with dr perkins at 36wks and can discuss induction at 37w at that time.        Chief Complaint   Patient presents with    Routine Prenatal Visit          Subjective     Cammy Mariee is a 38 y.o. female being seen today for her obstetrical visit. She is at 33w1d gestation.  She reports doing well overall since last visit, feeling better and taking labetalol for new diagnosed  "gestational hypertension.  She reports some fatigue otherwise no side effects.  Patient reports no bleeding, no contractions, no cramping, no leaking, and denies swelling, abdominal pain, nausea/vomiting or worsening reflux.  She has normal urination and bowel movement. .   Fetal movement: normal.    She is taking labetolol BID at home, states BP's are excellent at home 120's/75.  She has not had any more episodes of tachycardia.   No dizziness or lightheadedness, HA or blurred vision.      Menstrual History:  OB History          3    Para   1    Term   1            AB   1    Living   1         SAB   1    IAB   0    Ectopic   0    Multiple   0    Live Births   1                Menarche age: N/A  Patient's last menstrual period was 10/11/2023 (approximate).       The following portions of the patient's history were reviewed and updated as appropriate: allergies, current medications, past family history, past medical history, past social history, past surgical history, and problem list.    Review of Systems  Pertinent items are noted in HPI.     Objective     /80 (BP Location: Left arm, Patient Position: Sitting, Cuff Size: Adult)   Ht 5' 4\" (1.626 m)   Wt 99 kg (218 lb 3.2 oz)   LMP 10/11/2023 (Approximate)   BMI 37.45 kg/m²   FHT:  142 BPM   Uterine Size: 33 cm and size equals dates   Presentation: unsure              "

## 2024-05-30 NOTE — TELEPHONE ENCOUNTER
Patient called and saw Dr Galvez yesterday and she said a nst test was ordered and she wants to clarify if her nst should be  twice a week or once a week for the first 2 weeks and mfm ordered once a week for 2 weeks and her ob wants to clarify with the doctor . She asked if she could go [place] or [place] at all.    Please call patient back at [phone] . Thank you

## 2024-06-03 ENCOUNTER — ROUTINE PRENATAL (OUTPATIENT)
Facility: HOSPITAL | Age: 39
End: 2024-06-03
Payer: COMMERCIAL

## 2024-06-03 VITALS
SYSTOLIC BLOOD PRESSURE: 114 MMHG | WEIGHT: 218 LBS | HEIGHT: 64 IN | HEART RATE: 96 BPM | BODY MASS INDEX: 37.22 KG/M2 | DIASTOLIC BLOOD PRESSURE: 72 MMHG

## 2024-06-03 DIAGNOSIS — E66.01 SEVERE OBESITY DUE TO EXCESS CALORIES AFFECTING PREGNANCY IN THIRD TRIMESTER (HCC): ICD-10-CM

## 2024-06-03 DIAGNOSIS — O99.213 SEVERE OBESITY DUE TO EXCESS CALORIES AFFECTING PREGNANCY IN THIRD TRIMESTER (HCC): ICD-10-CM

## 2024-06-03 DIAGNOSIS — O09.523 AMA (ADVANCED MATERNAL AGE) MULTIGRAVIDA 35+, THIRD TRIMESTER: ICD-10-CM

## 2024-06-03 DIAGNOSIS — Z3A.33 33 WEEKS GESTATION OF PREGNANCY: Primary | ICD-10-CM

## 2024-06-03 DIAGNOSIS — O13.3 GESTATIONAL HYPERTENSION, THIRD TRIMESTER: ICD-10-CM

## 2024-06-03 PROCEDURE — 76818 FETAL BIOPHYS PROFILE W/NST: CPT | Performed by: OBSTETRICS & GYNECOLOGY

## 2024-06-03 NOTE — PROGRESS NOTES
A fetal ultrasound and NST were completed. See Ob procedures in Epic for an interpretation and recommendations. Do not hesitate to contact us in [facility] if you have questions.    Bong Conrad MD, MSCE  Maternal Fetal Medicine

## 2024-06-03 NOTE — PROGRESS NOTES
Repeat Non-Stress Testing:    Patient verbalizes +FM. Pt denies ALL:               Leaking of fluid   Contractions   Vaginal bleeding   Decreased fetal movement    Patient is performing daily kick counts. Patient has no questions or concerns.   NST strip reviewed by Dr. Conrad and requests add on BPP- sonographer Cinthya christian.

## 2024-06-04 ENCOUNTER — TELEPHONE (OUTPATIENT)
Age: 39
End: 2024-06-04

## 2024-06-04 NOTE — TELEPHONE ENCOUNTER
Pt requested her FMLA to be faxed to [phone] when completed if possible, and please reach out when forms are ready for .

## 2024-06-06 ENCOUNTER — ULTRASOUND (OUTPATIENT)
Dept: PERINATAL CARE | Facility: OTHER | Age: 39
End: 2024-06-06
Payer: COMMERCIAL

## 2024-06-06 VITALS
HEIGHT: 64 IN | WEIGHT: 219.6 LBS | DIASTOLIC BLOOD PRESSURE: 76 MMHG | BODY MASS INDEX: 37.49 KG/M2 | HEART RATE: 91 BPM | SYSTOLIC BLOOD PRESSURE: 128 MMHG

## 2024-06-06 DIAGNOSIS — Z3A.34 34 WEEKS GESTATION OF PREGNANCY: ICD-10-CM

## 2024-06-06 DIAGNOSIS — O13.3 GESTATIONAL HYPERTENSION, THIRD TRIMESTER: Primary | ICD-10-CM

## 2024-06-06 PROCEDURE — 99213 OFFICE O/P EST LOW 20 MIN: CPT | Performed by: NURSE PRACTITIONER

## 2024-06-06 PROCEDURE — 59025 FETAL NON-STRESS TEST: CPT | Performed by: NURSE PRACTITIONER

## 2024-06-06 PROCEDURE — 76815 OB US LIMITED FETUS(S): CPT | Performed by: NURSE PRACTITIONER

## 2024-06-06 NOTE — PROGRESS NOTES
Repeat Non-Stress Testing:    Patient verbalizes +FM. Pt denies ALL:               Leaking of fluid   Contractions   Vaginal bleeding   Decreased fetal movement    Patient is performing daily kick counts. Patient has no questions or concerns.   NST strip reviewed by MEG Lauren.

## 2024-06-06 NOTE — PROGRESS NOTES
"[facility]: Ms. Mariee was seen today at 34w1d gestational age for NST (found under the pregnancy episode) which I reviewed the RN assessment and agree, and DYLAN (see ultrasound report under OB procedures tab).  See ultrasound report under \"OB Procedures\" tab.    Citlaly WEAVER    I spent 10 minutes devoted to patient care (3 min chart preparation, 4 minutes face to face and 3 minutes documenting).      "

## 2024-06-06 NOTE — TELEPHONE ENCOUNTER
Spoke with the patient and made aware paperwork was faxed for her  ,also a copy was put into her chart

## 2024-06-12 ENCOUNTER — ROUTINE PRENATAL (OUTPATIENT)
Dept: OBGYN CLINIC | Facility: CLINIC | Age: 39
End: 2024-06-12
Payer: COMMERCIAL

## 2024-06-12 VITALS
HEART RATE: 78 BPM | RESPIRATION RATE: 16 BRPM | WEIGHT: 220 LBS | SYSTOLIC BLOOD PRESSURE: 124 MMHG | BODY MASS INDEX: 37.76 KG/M2 | DIASTOLIC BLOOD PRESSURE: 74 MMHG

## 2024-06-12 DIAGNOSIS — O13.3 GESTATIONAL HYPERTENSION, THIRD TRIMESTER: ICD-10-CM

## 2024-06-12 DIAGNOSIS — Z34.93 PRENATAL CARE IN THIRD TRIMESTER: Primary | ICD-10-CM

## 2024-06-12 DIAGNOSIS — Z3A.35 35 WEEKS GESTATION OF PREGNANCY: ICD-10-CM

## 2024-06-12 LAB
SL AMB  POCT GLUCOSE, UA: NEGATIVE
SL AMB LEUKOCYTE ESTERASE,UA: NEGATIVE
SL AMB POCT KETONES,UA: NEGATIVE
SL AMB POCT URINE PROTEIN: NEGATIVE

## 2024-06-12 PROCEDURE — 99213 OFFICE O/P EST LOW 20 MIN: CPT | Performed by: PHYSICIAN ASSISTANT

## 2024-06-12 PROCEDURE — 81002 URINALYSIS NONAUTO W/O SCOPE: CPT | Performed by: PHYSICIAN ASSISTANT

## 2024-06-12 NOTE — PROGRESS NOTES
Assessment     Pregnancy 35 and 0/7 weeks     Plan    Third trimester routine OB visit  AMA, obesity, gestational hypertension  Patient feeling well without any concerns today.  Stable GERD  Oral iron for anemia    Blood pressure today 124/74,  with fetal movement appreciated  B+ blood type  Urine dip unremarkable     Patient will continue following with [facility] for fetal surveillance with weekly DYLAN, NST twice weekly.  Per [facility] they are recommending delivery at 37 weeks 0/7 days due to the gestational hypertension.  Patient will coordinate this at next visit 36 weeks gestation with Dr. Alexandre.    Blood pressure stable, no protein in urine, asymptomatic, last urine protein creatinine ratio 0.2.  Continue labetalol 100 mg twice daily, continue monitoring BPs at home and call if consistently greater than 140/90 or symptomatic with signs/symptoms preeclampsia reviewed.    Patient to continue PNV daily  Continue ASA daily, DC at 36 weeks  Stressed importance of staying well-hydrated drinking plenty water, well-balanced diet, exercise as tolerated.  Stressed importance of kick counts and monitoring fetal movement at home    Follow up in 1 Week.    Needs GBS culture      Chief Complaint   Patient presents with    Routine Prenatal Visit     No new concerns today        Subjective     Cammy Mariee is a 38 y.o. female being seen today for her obstetrical visit. She is at 35w0d gestation.  She is doing well overall and reports no new concerns or complaints today.   AMA, obesity, gestational hypertension   patient reports no bleeding, no contractions, no cramping, no leaking, and denies headache, blurred vision, dizziness, abdominal pain, nausea/vomiting, reflux or swelling.  Normal urination and bowel movement .     Fetal movement: normal.    She is taking labetalol 100 mg twice daily.  She reports blood pressures are reasonable with highest systolic 130s and highest diastolic 80s.  She is asymptomatic.    Menstrual  History:  OB History          3    Para   1    Term   1            AB   1    Living   1         SAB   1    IAB   0    Ectopic   0    Multiple   0    Live Births   1                Menarche age: N/A  Patient's last menstrual period was 10/11/2023 (approximate).       The following portions of the patient's history were reviewed and updated as appropriate: allergies, current medications, past family history, past medical history, past social history, past surgical history, and problem list.    Review of Systems  Pertinent items are noted in HPI.     Objective     /74 (BP Location: Right arm, Patient Position: Sitting, Cuff Size: Standard)   Pulse 78   Resp 16   Wt 99.8 kg (220 lb)   LMP 10/11/2023 (Approximate)   BMI 37.76 kg/m²   FHT: 148 BPM   Uterine Size: 35 cm and size equals dates   Presentation: unsure

## 2024-06-14 ENCOUNTER — ROUTINE PRENATAL (OUTPATIENT)
Facility: HOSPITAL | Age: 39
End: 2024-06-14
Payer: COMMERCIAL

## 2024-06-14 VITALS
BODY MASS INDEX: 37.52 KG/M2 | HEIGHT: 64 IN | WEIGHT: 219.8 LBS | HEART RATE: 74 BPM | SYSTOLIC BLOOD PRESSURE: 122 MMHG | DIASTOLIC BLOOD PRESSURE: 78 MMHG

## 2024-06-14 DIAGNOSIS — O99.213 SEVERE OBESITY DUE TO EXCESS CALORIES AFFECTING PREGNANCY IN THIRD TRIMESTER (HCC): ICD-10-CM

## 2024-06-14 DIAGNOSIS — O09.523 AMA (ADVANCED MATERNAL AGE) MULTIGRAVIDA 35+, THIRD TRIMESTER: ICD-10-CM

## 2024-06-14 DIAGNOSIS — E66.01 SEVERE OBESITY DUE TO EXCESS CALORIES AFFECTING PREGNANCY IN THIRD TRIMESTER (HCC): ICD-10-CM

## 2024-06-14 DIAGNOSIS — O13.3 GESTATIONAL HYPERTENSION, THIRD TRIMESTER: ICD-10-CM

## 2024-06-14 DIAGNOSIS — Z3A.35 35 WEEKS GESTATION OF PREGNANCY: Primary | ICD-10-CM

## 2024-06-14 PROCEDURE — 76815 OB US LIMITED FETUS(S): CPT | Performed by: OBSTETRICS & GYNECOLOGY

## 2024-06-14 PROCEDURE — 59025 FETAL NON-STRESS TEST: CPT | Performed by: OBSTETRICS & GYNECOLOGY

## 2024-06-14 NOTE — PROGRESS NOTES
A fetal ultrasound and NST were completed. See Ob procedures in Epic for an interpretation and recommendations. Do not hesitate to contact us in [facility] if you have questions.    Bong Conrad MD, MSCE  Maternal Fetal Medicine

## 2024-06-14 NOTE — PROGRESS NOTES
Repeat Non-Stress Testing:    Patient verbalizes +FM. Pt denies ALL:               Leaking of fluid   Contractions   Vaginal bleeding   Decreased fetal movement    Patient is performing daily kick counts. Patient has no questions or concerns.   NST strip reviewed by Dr. Conrad.

## 2024-06-18 ENCOUNTER — OFFICE VISIT (OUTPATIENT)
Dept: OBGYN CLINIC | Facility: CLINIC | Age: 39
End: 2024-06-18
Payer: COMMERCIAL

## 2024-06-18 ENCOUNTER — ROUTINE PRENATAL (OUTPATIENT)
Facility: HOSPITAL | Age: 39
End: 2024-06-18
Payer: COMMERCIAL

## 2024-06-18 VITALS
BODY MASS INDEX: 37.8 KG/M2 | SYSTOLIC BLOOD PRESSURE: 130 MMHG | WEIGHT: 221.4 LBS | DIASTOLIC BLOOD PRESSURE: 84 MMHG | HEIGHT: 64 IN

## 2024-06-18 VITALS
HEART RATE: 80 BPM | SYSTOLIC BLOOD PRESSURE: 108 MMHG | BODY MASS INDEX: 37.9 KG/M2 | WEIGHT: 222 LBS | HEIGHT: 64 IN | DIASTOLIC BLOOD PRESSURE: 72 MMHG

## 2024-06-18 DIAGNOSIS — Z3A.35 35 WEEKS GESTATION OF PREGNANCY: Primary | ICD-10-CM

## 2024-06-18 DIAGNOSIS — Z3A.36 36 WEEKS GESTATION OF PREGNANCY: Primary | ICD-10-CM

## 2024-06-18 DIAGNOSIS — O13.3 GESTATIONAL HYPERTENSION, THIRD TRIMESTER: ICD-10-CM

## 2024-06-18 LAB
SL AMB  POCT GLUCOSE, UA: NEGATIVE
SL AMB LEUKOCYTE ESTERASE,UA: NEGATIVE
SL AMB POCT CLARITY,UA: CLEAR
SL AMB POCT COLOR,UA: NORMAL
SL AMB POCT NITRITE,UA: NEGATIVE
SL AMB POCT URINE PROTEIN: NEGATIVE

## 2024-06-18 PROCEDURE — 59025 FETAL NON-STRESS TEST: CPT | Performed by: OBSTETRICS & GYNECOLOGY

## 2024-06-18 PROCEDURE — 87150 DNA/RNA AMPLIFIED PROBE: CPT | Performed by: OBSTETRICS & GYNECOLOGY

## 2024-06-18 PROCEDURE — 81003 URINALYSIS AUTO W/O SCOPE: CPT | Performed by: OBSTETRICS & GYNECOLOGY

## 2024-06-18 PROCEDURE — 87591 N.GONORRHOEAE DNA AMP PROB: CPT | Performed by: OBSTETRICS & GYNECOLOGY

## 2024-06-18 PROCEDURE — 87491 CHLMYD TRACH DNA AMP PROBE: CPT | Performed by: OBSTETRICS & GYNECOLOGY

## 2024-06-18 PROCEDURE — PNV: Performed by: OBSTETRICS & GYNECOLOGY

## 2024-06-18 NOTE — PROGRESS NOTES
Cammy Mariee is 37aoa4g, here for her routine ob appt; GBS, GC/c cultures ordered. Pt denies any LOF, VB, or CTXs.  Pt would like to discuss induction.  +FM?:  yes

## 2024-06-18 NOTE — PROGRESS NOTES
Repeat Non-Stress Testing:    Patient verbalizes +FM. Pt denies ALL:               Leaking of fluid   Contractions   Vaginal bleeding   Decreased fetal movement    Patient is performing daily kick counts. Patient has no questions or concerns.   NST strip reviewed by Dr. Melo prior to completion of appointment.

## 2024-06-19 LAB
C TRACH DNA SPEC QL NAA+PROBE: NEGATIVE
N GONORRHOEA DNA SPEC QL NAA+PROBE: NEGATIVE

## 2024-06-20 LAB — GP B STREP DNA SPEC QL NAA+PROBE: NEGATIVE

## 2024-06-21 ENCOUNTER — ULTRASOUND (OUTPATIENT)
Dept: PERINATAL CARE | Facility: CLINIC | Age: 39
End: 2024-06-21
Payer: COMMERCIAL

## 2024-06-21 VITALS
BODY MASS INDEX: 37.97 KG/M2 | HEART RATE: 87 BPM | DIASTOLIC BLOOD PRESSURE: 72 MMHG | SYSTOLIC BLOOD PRESSURE: 118 MMHG | HEIGHT: 64 IN | WEIGHT: 222.4 LBS

## 2024-06-21 DIAGNOSIS — O09.523 AMA (ADVANCED MATERNAL AGE) MULTIGRAVIDA 35+, THIRD TRIMESTER: Primary | ICD-10-CM

## 2024-06-21 DIAGNOSIS — Z3A.36 36 WEEKS GESTATION OF PREGNANCY: ICD-10-CM

## 2024-06-21 DIAGNOSIS — O13.3 GESTATIONAL HYPERTENSION, THIRD TRIMESTER: ICD-10-CM

## 2024-06-21 DIAGNOSIS — Z36.89 ENCOUNTER FOR ULTRASOUND TO ASSESS FETAL GROWTH: ICD-10-CM

## 2024-06-21 PROCEDURE — 59025 FETAL NON-STRESS TEST: CPT | Performed by: STUDENT IN AN ORGANIZED HEALTH CARE EDUCATION/TRAINING PROGRAM

## 2024-06-21 PROCEDURE — 76816 OB US FOLLOW-UP PER FETUS: CPT | Performed by: STUDENT IN AN ORGANIZED HEALTH CARE EDUCATION/TRAINING PROGRAM

## 2024-06-21 PROCEDURE — 99213 OFFICE O/P EST LOW 20 MIN: CPT | Performed by: STUDENT IN AN ORGANIZED HEALTH CARE EDUCATION/TRAINING PROGRAM

## 2024-06-21 NOTE — PROGRESS NOTES
[facility]: Ms. Mariee was seen today for NST (found under the pregnancy episode) which I reviewed the RN assessment and agree, and fetal growth ultrasound (see ultrasound report under OB procedures tab).         MDM:   I. Diagnoses/Problems addressed:  gHTN  II.  Data: I ordered the following tests: CBC, CMP, urine protein.  III.  Risk of morbidity: Low    Please don't hesitate to contact our office with any concerns or questions.  -Shila Petersen MD

## 2024-06-21 NOTE — PROGRESS NOTES
Repeat Non-Stress Testing:    Patient verbalizes +FM. Pt denies ALL:               Leaking of fluid   Contractions   Vaginal bleeding   Decreased fetal movement    Patient is performing daily kick counts. Patient has no questions or concerns.   NST strip reviewed by Dr. Petersen.

## 2024-06-23 LAB
ALBUMIN SERPL-MCNC: 3.3 G/DL (ref 3.6–5.1)
ALBUMIN/GLOB SERPL: 1.3 (CALC) (ref 1–2.5)
ALP SERPL-CCNC: 97 U/L (ref 31–125)
ALT SERPL-CCNC: 27 U/L (ref 6–29)
AST SERPL-CCNC: 23 U/L (ref 10–30)
BASOPHILS # BLD AUTO: 26 CELLS/UL (ref 0–200)
BASOPHILS NFR BLD AUTO: 0.3 %
BILIRUB SERPL-MCNC: 0.3 MG/DL (ref 0.2–1.2)
BUN SERPL-MCNC: 9 MG/DL (ref 7–25)
BUN/CREAT SERPL: ABNORMAL (CALC) (ref 6–22)
CALCIUM SERPL-MCNC: 8.4 MG/DL (ref 8.6–10.2)
CHLORIDE SERPL-SCNC: 104 MMOL/L (ref 98–110)
CO2 SERPL-SCNC: 21 MMOL/L (ref 20–32)
CREAT SERPL-MCNC: 0.63 MG/DL (ref 0.5–0.97)
CREAT UR-MCNC: 45 MG/DL (ref 20–275)
EOSINOPHIL # BLD AUTO: 200 CELLS/UL (ref 15–500)
EOSINOPHIL NFR BLD AUTO: 2.3 %
ERYTHROCYTE [DISTWIDTH] IN BLOOD BY AUTOMATED COUNT: 13.4 % (ref 11–15)
GFR/BSA.PRED SERPLBLD CYS-BASED-ARV: 117 ML/MIN/1.73M2
GLOBULIN SER CALC-MCNC: 2.5 G/DL (CALC) (ref 1.9–3.7)
GLUCOSE SERPL-MCNC: 79 MG/DL (ref 65–99)
HCT VFR BLD AUTO: 33.1 % (ref 35–45)
HGB BLD-MCNC: 10.5 G/DL (ref 11.7–15.5)
LYMPHOCYTES # BLD AUTO: 1166 CELLS/UL (ref 850–3900)
LYMPHOCYTES NFR BLD AUTO: 13.4 %
MCH RBC QN AUTO: 26.2 PG (ref 27–33)
MCHC RBC AUTO-ENTMCNC: 31.7 G/DL (ref 32–36)
MCV RBC AUTO: 82.5 FL (ref 80–100)
MONOCYTES # BLD AUTO: 635 CELLS/UL (ref 200–950)
MONOCYTES NFR BLD AUTO: 7.3 %
NEUTROPHILS # BLD AUTO: 6673 CELLS/UL (ref 1500–7800)
NEUTROPHILS NFR BLD AUTO: 76.7 %
PLATELET # BLD AUTO: 244 THOUSAND/UL (ref 140–400)
PMV BLD REES-ECKER: 9.6 FL (ref 7.5–12.5)
POTASSIUM SERPL-SCNC: 4.3 MMOL/L (ref 3.5–5.3)
PROT SERPL-MCNC: 5.8 G/DL (ref 6.1–8.1)
PROT UR-MCNC: 7 MG/DL (ref 5–24)
PROT/CREAT UR: 0.16 MG/MG CREAT (ref 0.02–0.18)
PROT/CREAT UR: 156 MG/G CREAT (ref 24–184)
RBC # BLD AUTO: 4.01 MILLION/UL (ref 3.8–5.1)
SODIUM SERPL-SCNC: 134 MMOL/L (ref 135–146)
WBC # BLD AUTO: 8.7 THOUSAND/UL (ref 3.8–10.8)

## 2024-06-24 ENCOUNTER — ROUTINE PRENATAL (OUTPATIENT)
Facility: HOSPITAL | Age: 39
End: 2024-06-24
Payer: COMMERCIAL

## 2024-06-24 VITALS
BODY MASS INDEX: 38.14 KG/M2 | SYSTOLIC BLOOD PRESSURE: 128 MMHG | WEIGHT: 223.4 LBS | HEART RATE: 85 BPM | DIASTOLIC BLOOD PRESSURE: 86 MMHG | HEIGHT: 64 IN

## 2024-06-24 DIAGNOSIS — Z3A.36 36 WEEKS GESTATION OF PREGNANCY: ICD-10-CM

## 2024-06-24 DIAGNOSIS — O13.3 GESTATIONAL HYPERTENSION, THIRD TRIMESTER: Primary | ICD-10-CM

## 2024-06-24 PROCEDURE — 59025 FETAL NON-STRESS TEST: CPT | Performed by: OBSTETRICS & GYNECOLOGY

## 2024-06-24 NOTE — PROGRESS NOTES
Repeat Non-Stress Testing:    Patient verbalizes +FM. Pt denies ALL:               Leaking of fluid   Contractions   Vaginal bleeding   Decreased fetal movement    Patient is performing daily kick counts. Patient has no questions or concerns. Pt scheduled for IOL on 6/26/24.  NST strip reviewed by Dr. Melo prior to completion of appointment.

## 2024-06-26 ENCOUNTER — HOSPITAL ENCOUNTER (OUTPATIENT)
Dept: LABOR AND DELIVERY | Facility: HOSPITAL | Age: 39
Discharge: HOME | End: 2024-06-26
Payer: COMMERCIAL

## 2024-06-26 ENCOUNTER — HOSPITAL ENCOUNTER (INPATIENT)
Facility: HOSPITAL | Age: 39
LOS: 2 days | Discharge: HOME | End: 2024-06-28
Attending: OBSTETRICS & GYNECOLOGY | Admitting: OBSTETRICS & GYNECOLOGY
Payer: COMMERCIAL

## 2024-06-26 DIAGNOSIS — Z3A.37 37 WEEKS GESTATION OF PREGNANCY: Primary | ICD-10-CM

## 2024-06-26 PROBLEM — L30.9 ECZEMA: Status: ACTIVE | Noted: 2024-06-26

## 2024-06-26 PROBLEM — Z34.90 ENCOUNTER FOR INDUCTION OF LABOR: Status: ACTIVE | Noted: 2024-01-25

## 2024-06-26 LAB
ABO GROUP BLD: NORMAL
ALBUMIN SERPL BCG-MCNC: 3.3 G/DL (ref 3.5–5)
ALP SERPL-CCNC: 87 U/L (ref 34–104)
ALT SERPL W P-5'-P-CCNC: 26 U/L (ref 7–52)
ANION GAP SERPL CALCULATED.3IONS-SCNC: 7 MMOL/L (ref 4–13)
AST SERPL W P-5'-P-CCNC: 27 U/L (ref 13–39)
BILIRUB SERPL-MCNC: 0.25 MG/DL (ref 0.2–1)
BLD GP AB SCN SERPL QL: NEGATIVE
BUN SERPL-MCNC: 10 MG/DL (ref 5–25)
CALCIUM ALBUM COR SERPL-MCNC: 9.3 MG/DL (ref 8.3–10.1)
CALCIUM SERPL-MCNC: 8.7 MG/DL (ref 8.4–10.2)
CHLORIDE SERPL-SCNC: 105 MMOL/L (ref 96–108)
CO2 SERPL-SCNC: 23 MMOL/L (ref 21–32)
CREAT SERPL-MCNC: 0.69 MG/DL (ref 0.6–1.3)
CREAT UR-MCNC: 25.6 MG/DL
ERYTHROCYTE [DISTWIDTH] IN BLOOD BY AUTOMATED COUNT: 14.3 % (ref 11.6–15.1)
GFR SERPL CREATININE-BSD FRML MDRD: 111 ML/MIN/1.73SQ M
GLUCOSE SERPL-MCNC: 92 MG/DL (ref 65–140)
HCT VFR BLD AUTO: 29.9 % (ref 34.8–46.1)
HGB BLD-MCNC: 9.8 G/DL (ref 11.5–15.4)
MCH RBC QN AUTO: 26.6 PG (ref 26.8–34.3)
MCHC RBC AUTO-ENTMCNC: 32.8 G/DL (ref 31.4–37.4)
MCV RBC AUTO: 81 FL (ref 82–98)
PLATELET # BLD AUTO: 223 THOUSANDS/UL (ref 149–390)
PMV BLD AUTO: 9.4 FL (ref 8.9–12.7)
POTASSIUM SERPL-SCNC: 3.9 MMOL/L (ref 3.5–5.3)
PROT SERPL-MCNC: 6 G/DL (ref 6.4–8.4)
PROT UR-MCNC: 4 MG/DL
PROT/CREAT UR: 0.16 MG/G{CREAT} (ref 0–0.1)
RBC # BLD AUTO: 3.68 MILLION/UL (ref 3.81–5.12)
RH BLD: POSITIVE
SODIUM SERPL-SCNC: 135 MMOL/L (ref 135–147)
SPECIMEN EXPIRATION DATE: NORMAL
WBC # BLD AUTO: 8.79 THOUSAND/UL (ref 4.31–10.16)

## 2024-06-26 PROCEDURE — 82570 ASSAY OF URINE CREATININE: CPT

## 2024-06-26 PROCEDURE — 86901 BLOOD TYPING SEROLOGIC RH(D): CPT

## 2024-06-26 PROCEDURE — 4A1HXCZ MONITORING OF PRODUCTS OF CONCEPTION, CARDIAC RATE, EXTERNAL APPROACH: ICD-10-PCS | Performed by: STUDENT IN AN ORGANIZED HEALTH CARE EDUCATION/TRAINING PROGRAM

## 2024-06-26 PROCEDURE — 86900 BLOOD TYPING SEROLOGIC ABO: CPT

## 2024-06-26 PROCEDURE — 87340 HEPATITIS B SURFACE AG IA: CPT

## 2024-06-26 PROCEDURE — 85027 COMPLETE CBC AUTOMATED: CPT

## 2024-06-26 PROCEDURE — 86780 TREPONEMA PALLIDUM: CPT

## 2024-06-26 PROCEDURE — NC001 PR NO CHARGE: Performed by: OBSTETRICS & GYNECOLOGY

## 2024-06-26 PROCEDURE — 84156 ASSAY OF PROTEIN URINE: CPT

## 2024-06-26 PROCEDURE — 86850 RBC ANTIBODY SCREEN: CPT

## 2024-06-26 PROCEDURE — 10907ZC DRAINAGE OF AMNIOTIC FLUID, THERAPEUTIC FROM PRODUCTS OF CONCEPTION, VIA NATURAL OR ARTIFICIAL OPENING: ICD-10-PCS | Performed by: STUDENT IN AN ORGANIZED HEALTH CARE EDUCATION/TRAINING PROGRAM

## 2024-06-26 PROCEDURE — 80053 COMPREHEN METABOLIC PANEL: CPT

## 2024-06-26 RX ORDER — LABETALOL 100 MG/1
100 TABLET, FILM COATED ORAL 2 TIMES DAILY
Status: DISCONTINUED | OUTPATIENT
Start: 2024-06-26 | End: 2024-06-27

## 2024-06-26 RX ORDER — SODIUM CHLORIDE, SODIUM LACTATE, POTASSIUM CHLORIDE, CALCIUM CHLORIDE 600; 310; 30; 20 MG/100ML; MG/100ML; MG/100ML; MG/100ML
125 INJECTION, SOLUTION INTRAVENOUS CONTINUOUS
Status: DISCONTINUED | OUTPATIENT
Start: 2024-06-26 | End: 2024-06-27

## 2024-06-26 RX ORDER — BUPIVACAINE HYDROCHLORIDE 2.5 MG/ML
30 INJECTION, SOLUTION EPIDURAL; INFILTRATION; INTRACAUDAL ONCE AS NEEDED
Status: DISCONTINUED | OUTPATIENT
Start: 2024-06-26 | End: 2024-06-27

## 2024-06-26 RX ORDER — ONDANSETRON 2 MG/ML
4 INJECTION INTRAMUSCULAR; INTRAVENOUS EVERY 6 HOURS PRN
Status: DISCONTINUED | OUTPATIENT
Start: 2024-06-26 | End: 2024-06-27

## 2024-06-26 RX ORDER — ACETAMINOPHEN 325 MG/1
975 TABLET ORAL EVERY 6 HOURS PRN
Status: DISCONTINUED | OUTPATIENT
Start: 2024-06-26 | End: 2024-06-27

## 2024-06-26 RX ADMIN — SODIUM CHLORIDE, SODIUM LACTATE, POTASSIUM CHLORIDE, AND CALCIUM CHLORIDE 125 ML/HR: .6; .31; .03; .02 INJECTION, SOLUTION INTRAVENOUS at 17:18

## 2024-06-26 RX ADMIN — SODIUM CHLORIDE, SODIUM LACTATE, POTASSIUM CHLORIDE, AND CALCIUM CHLORIDE 999 ML/HR: .6; .31; .03; .02 INJECTION, SOLUTION INTRAVENOUS at 16:23

## 2024-06-26 RX ADMIN — LABETALOL HYDROCHLORIDE 100 MG: 100 TABLET, FILM COATED ORAL at 17:26

## 2024-06-26 NOTE — ASSESSMENT & PLAN NOTE
- On Labetalol 100 mg BID at home  CBC/CMP wnl; p:c ratio 0.16  Systolic (12hrs), Av , Min:116 , Max:142   Diastolic (12hrs), Av, Min:65, Max:94

## 2024-06-26 NOTE — PLAN OF CARE
Problem: PAIN - ADULT  Goal: Verbalizes/displays adequate comfort level or baseline comfort level  Description: Interventions:  - Encourage patient to monitor pain and request assistance  - Assess pain using appropriate pain scale  - Administer analgesics based on type and severity of pain and evaluate response  - Implement non-pharmacological measures as appropriate and evaluate response  - Consider cultural and social influences on pain and pain management  - Notify physician/advanced practitioner if interventions unsuccessful or patient reports new pain  Outcome: Progressing     Problem: INFECTION - ADULT  Goal: Absence or prevention of progression during hospitalization  Description: INTERVENTIONS:  - Assess and monitor for signs and symptoms of infection  - Monitor lab/diagnostic results  - Monitor all insertion sites, i.e. indwelling lines, tubes, and drains  - Monitor endotracheal if appropriate and nasal secretions for changes in amount and color  - [place] appropriate cooling/warming therapies per order  - Administer medications as ordered  - Instruct and encourage patient and family to use good hand hygiene technique  - Identify and instruct in appropriate isolation precautions for identified infection/condition  Outcome: Progressing  Goal: Absence of fever/infection during neutropenic period  Description: INTERVENTIONS:  - Monitor WBC    Outcome: Progressing     Problem: SAFETY ADULT  Goal: Patient will remain free of falls  Description: INTERVENTIONS:  - Educate patient/family on patient safety including physical limitations  - Instruct patient to call for assistance with activity   - Consult OT/PT to assist with strengthening/mobility   - Keep Call bell within reach  - Keep bed low and locked with side rails adjusted as appropriate  - Keep care items and personal belongings within reach  - Initiate and maintain comfort rounds  - Make Fall Risk Sign visible to staff  - Offer Toileting every  Hours,  in advance of need  - Initiate/Maintain alarm  - Obtain necessary fall risk management equipment:   - Apply yellow socks and bracelet for high fall risk patients  - Consider moving patient to room near nurses station  Outcome: Progressing  Goal: Maintain or return to baseline ADL function  Description: INTERVENTIONS:  -  Assess patient's ability to carry out ADLs; assess patient's baseline for ADL function and identify physical deficits which impact ability to perform ADLs (bathing, care of mouth/teeth, toileting, grooming, dressing, etc.)  - Assess/evaluate cause of self-care deficits   - Assess range of motion  - Assess patient's mobility; develop plan if impaired  - Assess patient's need for assistive devices and provide as appropriate  - Encourage maximum independence but intervene and supervise when necessary  - Involve family in performance of ADLs  - Assess for home care needs following discharge   - Consider OT consult to assist with ADL evaluation and planning for discharge  - Provide patient education as appropriate  Outcome: Progressing  Goal: Maintains/Returns to pre admission functional level  Description: INTERVENTIONS:  - Perform AM-PAC 6 Click Basic Mobility/ Daily Activity assessment daily.  - Set and communicate daily mobility goal to care team and patient/family/caregiver.   - Collaborate with rehabilitation services on mobility goals if consulted  - Perform Range of Motion  times a day.  - Reposition patient every  hours.  - Dangle patient  times a day  - Stand patient  times a day  - Ambulate patient  times a day  - Out of bed to chair  times a day   - Out of bed for meals  times a day  - Out of bed for toileting  - Record patient progress and toleration of activity level   Outcome: Progressing     Problem: DISCHARGE PLANNING  Goal: Discharge to home or other facility with appropriate resources  Description: INTERVENTIONS:  - Identify barriers to discharge w/patient and caregiver  - Arrange for  needed discharge resources and transportation as appropriate  - Identify discharge learning needs (meds, wound care, etc.)  - Arrange for interpretive services to assist at discharge as needed  - Refer to Case Management Department for coordinating discharge planning if the patient needs post-hospital services based on physician/advanced practitioner order or complex needs related to functional status, cognitive ability, or social support system  Outcome: Progressing     Problem: Knowledge Deficit  Goal: Patient/family/caregiver demonstrates understanding of disease process, treatment plan, medications, and discharge instructions  Description: Complete learning assessment and assess knowledge base.  Interventions:  - Provide teaching at level of understanding  - Provide teaching via preferred learning methods  Outcome: Progressing  Goal: Verbalizes understanding of labor plan  Description: Assess patient/family/caregiver's baseline knowledge level and ability to understand information.  Provide education via patient/family/caregiver's preferred learning method at appropriate level of understanding.     1. Provide teaching at level of understanding.  2. Provide teaching via preferred learning method(s).  Outcome: Progressing     Problem: Labor & Delivery  Goal: Manages discomfort  Description: Assess and monitor for signs and symptoms of discomfort.  Assess patient's pain level regularly and per hospital policy.  Administer medications as ordered. Support use of nonpharmacological methods to help control pain such as distraction, imagery, relaxation, and application of heat and cold.  Collaborate with interdisciplinary team and patient to determine appropriate pain management plan.    1. Include patient in decisions related to comfort.  2. Offer non-pharmacological pain management interventions.  3. Report ineffective pain management to physician.  Outcome: Progressing  Goal: Patient vital signs are  stable  Description: 1. Assess vital signs - vaginal delivery.  Outcome: Progressing     Problem: Nutrition/Hydration-ADULT  Goal: Nutrient/Hydration intake appropriate for improving, restoring or maintaining nutritional needs  Description: Monitor and assess patient's nutrition/hydration status for malnutrition. Collaborate with interdisciplinary team and initiate plan and interventions as ordered.  Monitor patient's weight and dietary intake as ordered or per policy. Utilize nutrition screening tool and intervene as necessary. Determine patient's food preferences and provide high-protein, high-caloric foods as appropriate.     INTERVENTIONS:  - Monitor oral intake, urinary output, labs, and treatment plans  - Assess nutrition and hydration status and recommend course of action  - Evaluate amount of meals eaten  - Assist patient with eating if necessary   - Allow adequate time for meals  - Recommend/ encourage appropriate diets, oral nutritional supplements, and vitamin/mineral supplements  - Order, calculate, and assess calorie counts as needed  - Recommend, monitor, and adjust tube feedings and TPN/PPN based on assessed needs  - Assess need for intravenous fluids  - Provide specific nutrition/hydration education as appropriate  - Include patient/family/caregiver in decisions related to nutrition  Outcome: Progressing     Problem: BIRTH - VAGINAL/ SECTION  Goal: Fetal and maternal status remain reassuring during the birth process  Description: INTERVENTIONS:  - Monitor vital signs  - Monitor fetal heart rate  - Monitor uterine activity  - Monitor labor progression (vaginal delivery)  - DVT prophylaxis  - Antibiotic prophylaxis  Outcome: Progressing  Goal: Emotionally satisfying birthing experience for mother/fetus  Description: Interventions:  - Assess, plan, implement and evaluate the nursing care given to the patient in labor  - Advocate the philosophy that each childbirth experience is a unique  experience and support the family's chosen level of involvement and control during the labor process   - Actively participate in both the patient's and family's teaching of the birth process  - Consider cultural, Buddhist and age-specific factors and plan care for the patient in labor  Outcome: Progressing

## 2024-06-26 NOTE — PLAN OF CARE
Problem: PAIN - ADULT  Goal: Verbalizes/displays adequate comfort level or baseline comfort level  Description: Interventions:  - Encourage patient to monitor pain and request assistance  - Assess pain using appropriate pain scale  - Administer analgesics based on type and severity of pain and evaluate response  - Implement non-pharmacological measures as appropriate and evaluate response  - Consider cultural and social influences on pain and pain management  - Notify physician/advanced practitioner if interventions unsuccessful or patient reports new pain  Outcome: Progressing     Problem: INFECTION - ADULT  Goal: Absence or prevention of progression during hospitalization  Description: INTERVENTIONS:  - Assess and monitor for signs and symptoms of infection  - Monitor lab/diagnostic results  - Monitor all insertion sites, i.e. indwelling lines, tubes, and drains  - Monitor endotracheal if appropriate and nasal secretions for changes in amount and color  - [place] appropriate cooling/warming therapies per order  - Administer medications as ordered  - Instruct and encourage patient and family to use good hand hygiene technique  - Identify and instruct in appropriate isolation precautions for identified infection/condition  Outcome: Progressing  Goal: Absence of fever/infection during neutropenic period  Description: INTERVENTIONS:  - Monitor WBC    Outcome: Progressing     Problem: SAFETY ADULT  Goal: Patient will remain free of falls  Description: INTERVENTIONS:  - Educate patient/family on patient safety including physical limitations  - Instruct patient to call for assistance with activity   - Consult OT/PT to assist with strengthening/mobility   - Keep Call bell within reach  - Keep bed low and locked with side rails adjusted as appropriate  - Keep care items and personal belongings within reach  - Initiate and maintain comfort rounds  - Consider moving patient to room near nurses station  Outcome:  Progressing  Goal: Maintain or return to baseline ADL function  Description: INTERVENTIONS:  -  Assess patient's ability to carry out ADLs; assess patient's baseline for ADL function and identify physical deficits which impact ability to perform ADLs (bathing, care of mouth/teeth, toileting, grooming, dressing, etc.)  - Assess/evaluate cause of self-care deficits   - Assess range of motion  - Assess patient's mobility; develop plan if impaired  - Assess patient's need for assistive devices and provide as appropriate  - Encourage maximum independence but intervene and supervise when necessary  - Involve family in performance of ADLs  - Assess for home care needs following discharge   - Consider OT consult to assist with ADL evaluation and planning for discharge  - Provide patient education as appropriate  Outcome: Progressing  Goal: Maintains/Returns to pre admission functional level  Description: INTERVENTIONS:  - Perform AM-PAC 6 Click Basic Mobility/ Daily Activity assessment daily.  - Set and communicate daily mobility goal to care team and patient/family/caregiver.   - Collaborate with rehabilitation services on mobility goals if consulted  - Out of bed for toileting  - Record patient progress and toleration of activity level   Outcome: Progressing     Problem: DISCHARGE PLANNING  Goal: Discharge to home or other facility with appropriate resources  Description: INTERVENTIONS:  - Identify barriers to discharge w/patient and caregiver  - Arrange for needed discharge resources and transportation as appropriate  - Identify discharge learning needs (meds, wound care, etc.)  - Arrange for interpretive services to assist at discharge as needed  - Refer to Case Management Department for coordinating discharge planning if the patient needs post-hospital services based on physician/advanced practitioner order or complex needs related to functional status, cognitive ability, or social support system  Outcome:  Progressing     Problem: Knowledge Deficit  Goal: Patient/family/caregiver demonstrates understanding of disease process, treatment plan, medications, and discharge instructions  Description: Complete learning assessment and assess knowledge base.  Interventions:  - Provide teaching at level of understanding  - Provide teaching via preferred learning methods  Outcome: Progressing  Goal: Verbalizes understanding of labor plan  Description: Assess patient/family/caregiver's baseline knowledge level and ability to understand information.  Provide education via patient/family/caregiver's preferred learning method at appropriate level of understanding.     1. Provide teaching at level of understanding.  2. Provide teaching via preferred learning method(s).  Outcome: Progressing     Problem: Labor & Delivery  Goal: Manages discomfort  Description: Assess and monitor for signs and symptoms of discomfort.  Assess patient's pain level regularly and per hospital policy.  Administer medications as ordered. Support use of nonpharmacological methods to help control pain such as distraction, imagery, relaxation, and application of heat and cold.  Collaborate with interdisciplinary team and patient to determine appropriate pain management plan.    1. Include patient in decisions related to comfort.  2. Offer non-pharmacological pain management interventions.  3. Report ineffective pain management to physician.  Outcome: Progressing  Goal: Patient vital signs are stable  Description: 1. Assess vital signs - vaginal delivery.  Outcome: Progressing     Problem: Nutrition/Hydration-ADULT  Goal: Nutrient/Hydration intake appropriate for improving, restoring or maintaining nutritional needs  Description: Monitor and assess patient's nutrition/hydration status for malnutrition. Collaborate with interdisciplinary team and initiate plan and interventions as ordered.  Monitor patient's weight and dietary intake as ordered or per policy.  Utilize nutrition screening tool and intervene as necessary. Determine patient's food preferences and provide high-protein, high-caloric foods as appropriate.     INTERVENTIONS:  - Monitor oral intake, urinary output, labs, and treatment plans  - Assess nutrition and hydration status and recommend course of action  - Evaluate amount of meals eaten  - Assist patient with eating if necessary   - Allow adequate time for meals  - Recommend/ encourage appropriate diets, oral nutritional supplements, and vitamin/mineral supplements  - Order, calculate, and assess calorie counts as needed  - Recommend, monitor, and adjust tube feedings and TPN/PPN based on assessed needs  - Assess need for intravenous fluids  - Provide specific nutrition/hydration education as appropriate  - Include patient/family/caregiver in decisions related to nutrition  Outcome: Progressing     Problem: BIRTH - VAGINAL/ SECTION  Goal: Fetal and maternal status remain reassuring during the birth process  Description: INTERVENTIONS:  - Monitor vital signs  - Monitor fetal heart rate  - Monitor uterine activity  - Monitor labor progression (vaginal delivery)  - DVT prophylaxis  - Antibiotic prophylaxis  Outcome: Progressing  Goal: Emotionally satisfying birthing experience for mother/fetus  Description: Interventions:  - Assess, plan, implement and evaluate the nursing care given to the patient in labor  - Advocate the philosophy that each childbirth experience is a unique experience and support the family's chosen level of involvement and control during the labor process   - Actively participate in both the patient's and family's teaching of the birth process  - Consider cultural, Pent[employer]al and age-specific factors and plan care for the patient in labor  Outcome: Progressing

## 2024-06-26 NOTE — H&P
H&P Exam - Obstetrics   Cammy Mariee 38 y.o. female MRN: [id number]  Unit/Bed#: -01 Encounter: [id number]      ASSESSMENT:  37 yo  at 37w0d weeks gestation who is being admitted for IOL for gHTN.  EFW: 3200 grams - 7 lbs 1 oz (80%)   VTX by transabdominal ultrasound     PLAN:    Eczema  Assessment & Plan  - Had it all her life  - New on bilateral thighs x2 weeks  - Home hydrocortisone ordered    Gestational hypertension, third trimester  Assessment & Plan  - On Labetalol 100 mg BID at home  - PEC labs ordered  - Monitor Bps throughout labor  - No current PEC symptoms    AMA (advanced maternal age) multigravida 35+, third trimester  Assessment & Plan  - Twice weekly NST/weekly DYLAN wnl antepartum    * Encounter for induction of labor  Assessment & Plan  Admit  Follow up CBC, RPR, Blood Type  Start with enriquez balloon  GBS neg status  Analgesia and/or epidural at patient request  Anticipate       Discussed with Dr. Alexandre      This patient will be an INPATIENT  and I certify the anticipated length of stay is >2 Midnights.      History of Present Illness     Chief Complaint: Induction of labor    HPI:  Cammy Mariee is a 38 y.o.  female with an ZAIRA of 2024, by Last Menstrual Period at 37w0d weeks gestation who is being admitted for IOL for gHTN.     Contractions: no  Loss of fluid: no  Vaginal bleeding: no  Fetal movement: yes    She is 's patient.     PREGNANCY COMPLICATIONS:   1) As above    OB History    Para Term  AB Living   3 1 1   1 1   SAB IAB Ectopic Multiple Live Births   1 0 0 0 1      # Outcome Date GA Lbr Deny/2nd Weight Sex Type Anes PTL Lv   3 Current            2 SAB 2017 7w0d          1 Term / 38w0d  3062 g (6 lb 12 oz) M Vag-Spont EPI N VERÓNICA      Complications: Gestational HTN       Baby complications/comments: none    Review of Systems   Constitutional:  Negative for chills and fever.   Eyes:  Negative for visual disturbance.    Respiratory:  Negative for chest tightness and shortness of breath.    Cardiovascular:  Negative for chest pain and palpitations.   Gastrointestinal:  Negative for abdominal pain.   Genitourinary:  Negative for vaginal bleeding, vaginal discharge and vaginal pain.   Musculoskeletal:  Negative for back pain.   Neurological:  Negative for headaches.         Historical Information   Past Medical History:   Diagnosis Date    Abnormal Pap smear of cervix     Colpo X1    Allergic     GERD (gastroesophageal reflux disease)     Hypertension     Miscarriage     4 years ago since 2022     Varicella     as a child     Past Surgical History:   Procedure Laterality Date    COLPOSCOPY       Social History   Social History     Substance and Sexual Activity   Alcohol Use Not Currently    Alcohol/week: 1.0 standard drink of alcohol    Types: 1 Glasses of wine per week    Comment: Social      Social History     Substance and Sexual Activity   Drug Use Never     Social History     Tobacco Use   Smoking Status Never   Smokeless Tobacco Never     Family History: non-contributory    Meds/Allergies      Medications Prior to Admission:     cetirizine (ZyrTEC) 10 MG chewable tablet    ferrous sulfate 324 (65 Fe) mg    labetalol (NORMODYNE) 100 mg tablet    omeprazole (PriLOSEC) 20 mg delayed release capsule    Prenatal Vit-Fe Fumarate-FA (PRENATAL VITAMINS PO)     Allergies   Allergen Reactions    Ciprofloxacin Anaphylaxis and Swelling       OBJECTIVE:    Vitals: Blood pressure 137/83, pulse 93, temperature 98.7 °F (37.1 °C), temperature source Oral, resp. rate 18, last menstrual period 10/11/2023, SpO2 97%, not currently breastfeeding.There is no height or weight on file to calculate BMI.     Physical Exam  HENT:      Head: Normocephalic.   Eyes:      Conjunctiva/sclera: Conjunctivae normal.   Cardiovascular:      Rate and Rhythm: Normal rate.      Pulses: Normal pulses.   Pulmonary:      Effort: Pulmonary effort is normal.   Abdominal:       Palpations: Abdomen is soft.      Tenderness: There is no abdominal tenderness.   Skin:     General: Skin is warm.      Capillary Refill: Capillary refill takes less than 2 seconds.   Neurological:      Mental Status: She is alert and oriented to person, place, and time.   Psychiatric:         Mood and Affect: Mood normal.         Cervix:  1.5/50/-3    Fetal heart rate:   150 bpm with moderate variability, accelerations, no decelerations    [place]:   Q2 mins    Prenatal Labs: Blood Type:   Lab Results   Component Value Date/Time    ABO Grouping B 01/06/2024 12:00 AM     , D (Rh type): pos ,   Antibody Screen: neg   HCT/HGB:   Lab Results   Component Value Date/Time    Hematocrit 29.9 (L) 06/26/2024 04:05 PM    Hemoglobin 9.8 (L) 06/26/2024 04:05 PM      , MCV:   Lab Results   Component Value Date/Time    MCV 81 (L) 06/26/2024 04:05 PM      , Platelets:   Lab Results   Component Value Date/Time    Platelets 223 06/26/2024 04:05 PM      , 1 hour Glucola:   Lab Results   Component Value Date/Time    Glucose 128 04/16/2024 08:58 AM    VDRL/RPR:   Lab Results   Component Value Date/Time    RPR NON-REACTIVE 04/18/2024 09:59 AM      , Hep B: ordered   Hep C: non-reactive   , HIV: non-reactive   , Chlamydia: neg  , Gonorrhea:   Lab Results   Component Value Date/Time    N gonorrhoeae, DNA Probe Negative 06/18/2024 10:36 AM     , Group B Strep:    Lab Results   Component Value Date/Time    Strep Grp B PCR Negative 06/18/2024 10:36 AM          Invasive Devices       Peripheral Intravenous Line  Duration             Peripheral IV 06/26/24 Dorsal (posterior);Left Hand <1 day                  Erica Blakely MD  PGY-3  6/26/2024  4:34 PM

## 2024-06-26 NOTE — ASSESSMENT & PLAN NOTE
- Continue routine postpartum care  - Pain well controlled with oral analgesics  - Voiding spontaneously  - Tolerating PO fluids and solids  - Encourage breastfeeding and familial bonding  - Disposition: anticipate d/c home PPD1-2

## 2024-06-27 ENCOUNTER — ANESTHESIA EVENT (INPATIENT)
Dept: ANESTHESIOLOGY | Facility: HOSPITAL | Age: 39
End: 2024-06-27
Payer: COMMERCIAL

## 2024-06-27 ENCOUNTER — ANESTHESIA (INPATIENT)
Dept: ANESTHESIOLOGY | Facility: HOSPITAL | Age: 39
End: 2024-06-27
Payer: COMMERCIAL

## 2024-06-27 LAB
BASE EXCESS BLDCOA CALC-SCNC: -7.3 MMOL/L (ref 3–11)
BASE EXCESS BLDCOV CALC-SCNC: -4.2 MMOL/L (ref 1–9)
HBV SURFACE AG SER QL: NORMAL
HCO3 BLDCOA-SCNC: 20.6 MMOL/L (ref 17.3–27.3)
HCO3 BLDCOV-SCNC: 21.4 MMOL/L (ref 12.2–28.6)
O2 CT VFR BLDCOA CALC: 10.2 ML/DL
OXYHGB MFR BLDCOA: 52.5 %
OXYHGB MFR BLDCOV: 61.3 %
PCO2 BLDCOA: 51.6 MM[HG] (ref 30–60)
PCO2 BLDCOV: 41.3 MM HG (ref 27–43)
PH BLDCOA: 7.22 [PH] (ref 7.23–7.43)
PH BLDCOV: 7.33 [PH] (ref 7.19–7.49)
PO2 BLDCOA: 26.1 MM HG (ref 5–25)
PO2 BLDCOV: 26 MM HG (ref 15–45)
SAO2 % BLDCOV: 11.8 ML/DL
TREPONEMA PALLIDUM IGG+IGM AB [PRESENCE] IN SERUM OR PLASMA BY IMMUNOASSAY: NORMAL

## 2024-06-27 PROCEDURE — NC001 PR NO CHARGE: Performed by: STUDENT IN AN ORGANIZED HEALTH CARE EDUCATION/TRAINING PROGRAM

## 2024-06-27 PROCEDURE — 82805 BLOOD GASES W/O2 SATURATION: CPT | Performed by: OBSTETRICS & GYNECOLOGY

## 2024-06-27 PROCEDURE — 59409 OBSTETRICAL CARE: CPT | Performed by: STUDENT IN AN ORGANIZED HEALTH CARE EDUCATION/TRAINING PROGRAM

## 2024-06-27 PROCEDURE — 0KQM0ZZ REPAIR PERINEUM MUSCLE, OPEN APPROACH: ICD-10-PCS | Performed by: STUDENT IN AN ORGANIZED HEALTH CARE EDUCATION/TRAINING PROGRAM

## 2024-06-27 RX ORDER — DIPHENHYDRAMINE HCL 25 MG
25 TABLET ORAL EVERY 6 HOURS PRN
Status: DISCONTINUED | OUTPATIENT
Start: 2024-06-27 | End: 2024-06-28 | Stop reason: HOSPADM

## 2024-06-27 RX ORDER — DOCUSATE SODIUM 100 MG/1
100 CAPSULE, LIQUID FILLED ORAL 2 TIMES DAILY
Status: DISCONTINUED | OUTPATIENT
Start: 2024-06-27 | End: 2024-06-28 | Stop reason: HOSPADM

## 2024-06-27 RX ORDER — BENZOCAINE/MENTHOL 6 MG-10 MG
1 LOZENGE MUCOUS MEMBRANE DAILY PRN
Status: DISCONTINUED | OUTPATIENT
Start: 2024-06-27 | End: 2024-06-28 | Stop reason: HOSPADM

## 2024-06-27 RX ORDER — OXYTOCIN/RINGER'S LACTATE 30/500 ML
PLASTIC BAG, INJECTION (ML) INTRAVENOUS
Status: DISPENSED
Start: 2024-06-27 | End: 2024-06-27

## 2024-06-27 RX ORDER — LIDOCAINE HYDROCHLORIDE AND EPINEPHRINE 15; 5 MG/ML; UG/ML
INJECTION, SOLUTION EPIDURAL
Status: COMPLETED | OUTPATIENT
Start: 2024-06-27 | End: 2024-06-27

## 2024-06-27 RX ORDER — SENNOSIDES 8.6 MG
1 TABLET ORAL DAILY
Status: DISCONTINUED | OUTPATIENT
Start: 2024-06-27 | End: 2024-06-28 | Stop reason: HOSPADM

## 2024-06-27 RX ORDER — OXYTOCIN/RINGER'S LACTATE 30/500 ML
1-30 PLASTIC BAG, INJECTION (ML) INTRAVENOUS
Status: DISCONTINUED | OUTPATIENT
Start: 2024-06-27 | End: 2024-06-27

## 2024-06-27 RX ORDER — MAGNESIUM HYDROXIDE/ALUMINUM HYDROXICE/SIMETHICONE 120; 1200; 1200 MG/30ML; MG/30ML; MG/30ML
15 SUSPENSION ORAL EVERY 6 HOURS PRN
Status: DISCONTINUED | OUTPATIENT
Start: 2024-06-27 | End: 2024-06-28 | Stop reason: HOSPADM

## 2024-06-27 RX ORDER — ONDANSETRON 2 MG/ML
4 INJECTION INTRAMUSCULAR; INTRAVENOUS EVERY 8 HOURS PRN
Status: DISCONTINUED | OUTPATIENT
Start: 2024-06-27 | End: 2024-06-28 | Stop reason: HOSPADM

## 2024-06-27 RX ORDER — LABETALOL 100 MG/1
100 TABLET, FILM COATED ORAL EVERY 12 HOURS SCHEDULED
Status: DISCONTINUED | OUTPATIENT
Start: 2024-06-27 | End: 2024-06-28 | Stop reason: HOSPADM

## 2024-06-27 RX ORDER — CALCIUM CARBONATE 500 MG/1
1000 TABLET, CHEWABLE ORAL 3 TIMES DAILY PRN
Status: DISCONTINUED | OUTPATIENT
Start: 2024-06-27 | End: 2024-06-28 | Stop reason: HOSPADM

## 2024-06-27 RX ORDER — ACETAMINOPHEN 325 MG/1
650 TABLET ORAL EVERY 6 HOURS SCHEDULED
Status: DISCONTINUED | OUTPATIENT
Start: 2024-06-27 | End: 2024-06-28 | Stop reason: HOSPADM

## 2024-06-27 RX ORDER — SIMETHICONE 80 MG
80 TABLET,CHEWABLE ORAL 4 TIMES DAILY PRN
Status: DISCONTINUED | OUTPATIENT
Start: 2024-06-27 | End: 2024-06-28 | Stop reason: HOSPADM

## 2024-06-27 RX ORDER — LORATADINE 10 MG/1
10 TABLET ORAL DAILY
Status: DISCONTINUED | OUTPATIENT
Start: 2024-06-28 | End: 2024-06-28 | Stop reason: HOSPADM

## 2024-06-27 RX ORDER — IBUPROFEN 600 MG/1
600 TABLET ORAL EVERY 6 HOURS SCHEDULED
Status: DISCONTINUED | OUTPATIENT
Start: 2024-06-27 | End: 2024-06-28 | Stop reason: HOSPADM

## 2024-06-27 RX ORDER — OXYTOCIN/RINGER'S LACTATE 30/500 ML
250 PLASTIC BAG, INJECTION (ML) INTRAVENOUS CONTINUOUS
Status: ACTIVE | OUTPATIENT
Start: 2024-06-27 | End: 2024-06-27

## 2024-06-27 RX ADMIN — SENNOSIDES 8.6 MG: 8.6 TABLET, FILM COATED ORAL at 14:16

## 2024-06-27 RX ADMIN — HYDROCORTISONE 1 APPLICATION: 1 CREAM TOPICAL at 13:43

## 2024-06-27 RX ADMIN — OXYTOCIN 2 MILLI-UNITS/MIN: 10 INJECTION INTRAVENOUS at 09:30

## 2024-06-27 RX ADMIN — LABETALOL HYDROCHLORIDE 100 MG: 100 TABLET, FILM COATED ORAL at 09:47

## 2024-06-27 RX ADMIN — IBUPROFEN 600 MG: 600 TABLET, FILM COATED ORAL at 14:14

## 2024-06-27 RX ADMIN — ACETAMINOPHEN 650 MG: 325 TABLET, FILM COATED ORAL at 14:13

## 2024-06-27 RX ADMIN — IBUPROFEN 600 MG: 600 TABLET, FILM COATED ORAL at 23:38

## 2024-06-27 RX ADMIN — LIDOCAINE HYDROCHLORIDE AND EPINEPHRINE 3 ML: 15; 5 INJECTION, SOLUTION EPIDURAL at 03:35

## 2024-06-27 RX ADMIN — ACETAMINOPHEN 650 MG: 325 TABLET, FILM COATED ORAL at 19:59

## 2024-06-27 RX ADMIN — SODIUM CHLORIDE, SODIUM LACTATE, POTASSIUM CHLORIDE, AND CALCIUM CHLORIDE 125 ML/HR: .6; .31; .03; .02 INJECTION, SOLUTION INTRAVENOUS at 05:02

## 2024-06-27 RX ADMIN — LABETALOL HYDROCHLORIDE 100 MG: 100 TABLET, FILM COATED ORAL at 20:52

## 2024-06-27 RX ADMIN — BENZOCAINE AND LEVOMENTHOL 1 APPLICATION: 200; 5 SPRAY TOPICAL at 13:43

## 2024-06-27 RX ADMIN — SODIUM CHLORIDE, SODIUM LACTATE, POTASSIUM CHLORIDE, AND CALCIUM CHLORIDE 125 ML/HR: .6; .31; .03; .02 INJECTION, SOLUTION INTRAVENOUS at 01:57

## 2024-06-27 RX ADMIN — ROPIVACAINE HYDROCHLORIDE: 2 INJECTION, SOLUTION EPIDURAL; INFILTRATION at 03:43

## 2024-06-27 RX ADMIN — DOCUSATE SODIUM 100 MG: 100 CAPSULE, LIQUID FILLED ORAL at 14:14

## 2024-06-27 NOTE — OB LABOR/OXYTOCIN SAFETY PROGRESS
Labor Progress Note - Cammy Mariee 38 y.o. female MRN: [id number]    Unit/Bed#: -01 Encounter: [id number]       Contraction Frequency (minutes): 2-3  Contraction Intensity: Moderate/Strong  Uterine Activity Characteristics: Regular  Cervical Dilation: 10  Dilation Complete Date: 06/27/24  Dilation Complete Time: 0805  Cervical Effacement: 100  Fetal Station: -1  Baseline Rate (FHR): 135 bpm  Fetal Heart Rate (FHT): 145 BPM  FHR Category: 2               Vital Signs:   Vitals:    06/27/24 0800   BP:    Pulse:    Resp:    Temp: 98.8 °F (37.1 °C)   SpO2:      Patient feeling more pressure. SVE complete at this time. Plan to start pushing. Dr. Sona christian.    Leslie Emery MD 6/27/2024 8:14 AM

## 2024-06-27 NOTE — L&D DELIVERY NOTE
Vaginal Delivery Summary - OB/GYN   Cammy Mariee 38 y.o. female MRN: [id number]  Unit/Bed#: -01 Encounter: [id number]    Pre-delivery Diagnosis:   1. Pregnancy at 37 weeks   2. Gestational hypertension  3. Eczema  4. Advanced maternal age    Post-delivery Diagnosis: same, delivered    Procedure: Spontaneous Vaginal Delivery    Attending: Dr. Magallanes    Assistant(s): Dr. Colunga    Anesthesia: Epidural    QBL: 357cc    Complications: none apparent    Specimens:   1. Arterial and venous cord gases  2. Cord blood  3. Segment of umbilical cord  4. Placenta to storage     Findings:  1. Viable male on 2024 at 1025, with APGARS pending  2. Spontaneous delivery of intact placenta at 1028  3. 2 degree laceration repaired with 3-0 vicryl rapide. Vaginal laceration repaired with 3-0 vicryl rapide  4. Umbilical Cord Venous Blood Gas:  Results from last 7 days   Lab Units 24  1029   PH COV  7.333   PCO2 COV mm HG 41.3   HCO3 COV mmol/L 21.4   BASE EXC COV mmol/L -4.2*   O2 CT CD VB mL/dL 11.8   O2 HGB, VENOUS CORD % 61.3     5. Umbilical Cord Arterial Blood Gas:  Results from last 7 days   Lab Units 24  1029   PH COA  7.220*   PCO2 COA  51.6   PO2 COA mm HG 26.1*   HCO3 COA mmol/L 20.6   BASE EXC COA mmol/L -7.3*   O2 CONTENT CORD ART ml/dl 10.2   O2 HGB, ARTERIAL CORD % 52.5         Disposition:  Patient tolerated the procedure well and was recovering in labor and delivery room       Brief history and labor course:  Cammy Mariee is now a 38 y.o.  at 37w1d wks who was initially admitted for scheduled induction of labor for gestational hypertension. Her pregnancy was complicated by gHTN (on labetalol 100 mg BID), advanced maternal age, and eczema. She presented for scheduled IOL. Upon presentation, SVE was 1.5/50/-3. IOL was started with FB. FB was expelled and amniotomy was performed for clear fluid. She progressed to complete and began pushing. While pushing, pitocin augmentation was  started.    Description of procedure:  After pushing for 2 hours and 1 minute, at 1025 patient delivered a viable male , wt pending, apgars pending. The fetal vertex delivered OP position spontaneously. There was no nuchal cord. The  anterior shoulder delivered atraumatically with maternal expulsive forces and the assistance of gentle downward traction. The posterior shoulder delivered with maternal expulsive forces and the assistance of gentle upward traction. The remainder of the fetus delivered spontaneously.     Upon delivery, the infant was placed on the mothers abdomen and the cord was clamped and cut. The infant was noted to cry spontaneously and was moving all extremities appropriately. There was no evidence for injury. Awaiting nurse resuscitators evaluated the . Arterial and venous cord blood gases and cord blood was collected for analysis. These were promptly sent to the lab. In the immediate post-partum, 30 units of IV pitocin was administered, and the uterus was noted to contract down well with massage and pitocin. The placenta delivered spontaneously at 1028 and was noted to have a centrally inserted 3 vessel cord.     The vagina, cervix, perineum, and rectum were inspected and there was noted to be a a right vaginal laceration and a 2nd degree perineal laceration.    Laceration Repair  Patient was comfortable with epidural at that time. The vaginal laceration was repaired in a running locked fashion and noted to be hemostatic.    2nd Degree Lac  The vagina, cervix, perineum, and rectum were inspected and there was noted to be a 2nd degree laceration which was repaired with 3-0 vicryl rapide. Under adequate anesthesia, the apex of the vaginal laceration was identified and an anchoring suture was placed 1 cm above the apex.  The vaginal mucosa and underlying rectovaginal fascia were closed using a running locked 3-0 Vicryl-CT 1 suture to the hymenal ring.  The suture was then brought  underneath the hymenal ring.  The suture was then placed through the bulbocavernosus muscle. Continuing with the same suture, the transverse perineal muscles were reapproximated with 2 transverse running sutures.  The suture was brought to the posterior apex of the skin laceration and then the skin was reapproximated in a subcuticular fashion to the hymenal ring. Excellent hemostasis was achieved.     At the conclusion of the procedure, all needle, sponge, and instrument counts were noted to be correct. Patient tolerated the procedure well and was allowed to recover in labor and delivery room with family and  before being transferred to the post-partum floor. Dr. Magallanes was present and participated in all key portions of the case.      Katie Colunga MD  OB/GYN PGY-1  2024  10:53 AM

## 2024-06-27 NOTE — PLAN OF CARE
Problem: PAIN - ADULT  Goal: Verbalizes/displays adequate comfort level or baseline comfort level  Description: Interventions:  - Encourage patient to monitor pain and request assistance  - Assess pain using appropriate pain scale  - Administer analgesics based on type and severity of pain and evaluate response  - Implement non-pharmacological measures as appropriate and evaluate response  - Consider cultural and social influences on pain and pain management  - Notify physician/advanced practitioner if interventions unsuccessful or patient reports new pain  Outcome: Progressing     Problem: INFECTION - ADULT  Goal: Absence or prevention of progression during hospitalization  Description: INTERVENTIONS:  - Assess and monitor for signs and symptoms of infection  - Monitor lab/diagnostic results  - Monitor all insertion sites, i.e. indwelling lines, tubes, and drains  - Monitor endotracheal if appropriate and nasal secretions for changes in amount and color  - [place] appropriate cooling/warming therapies per order  - Administer medications as ordered  - Instruct and encourage patient and family to use good hand hygiene technique  - Identify and instruct in appropriate isolation precautions for identified infection/condition  Outcome: Progressing  Goal: Absence of fever/infection during neutropenic period  Description: INTERVENTIONS:  - Monitor WBC    Outcome: Progressing     Problem: SAFETY ADULT  Goal: Patient will remain free of falls  Description: INTERVENTIONS:  - Educate patient/family on patient safety including physical limitations  - Instruct patient to call for assistance with activity   - Consult OT/PT to assist with strengthening/mobility   - Keep Call bell within reach  - Keep bed low and locked with side rails adjusted as appropriate  - Keep care items and personal belongings within reach  - Initiate and maintain comfort rounds  - Consider moving patient to room near nurses station  Outcome:  Progressing  Goal: Maintain or return to baseline ADL function  Description: INTERVENTIONS:  -  Assess patient's ability to carry out ADLs; assess patient's baseline for ADL function and identify physical deficits which impact ability to perform ADLs (bathing, care of mouth/teeth, toileting, grooming, dressing, etc.)  - Assess/evaluate cause of self-care deficits   - Assess range of motion  - Assess patient's mobility; develop plan if impaired  - Assess patient's need for assistive devices and provide as appropriate  - Encourage maximum independence but intervene and supervise when necessary  - Involve family in performance of ADLs  - Assess for home care needs following discharge   - Consider OT consult to assist with ADL evaluation and planning for discharge  - Provide patient education as appropriate  Outcome: Progressing  Goal: Maintains/Returns to pre admission functional level  Description: INTERVENTIONS:  - Perform AM-PAC 6 Click Basic Mobility/ Daily Activity assessment daily.  - Set and communicate daily mobility goal to care team and patient/family/caregiver.   - Collaborate with rehabilitation services on mobility goals if consulted  - Out of bed for toileting  - Record patient progress and toleration of activity level   Outcome: Progressing     Problem: DISCHARGE PLANNING  Goal: Discharge to home or other facility with appropriate resources  Description: INTERVENTIONS:  - Identify barriers to discharge w/patient and caregiver  - Arrange for needed discharge resources and transportation as appropriate  - Identify discharge learning needs (meds, wound care, etc.)  - Arrange for interpretive services to assist at discharge as needed  - Refer to Case Management Department for coordinating discharge planning if the patient needs post-hospital services based on physician/advanced practitioner order or complex needs related to functional status, cognitive ability, or social support system  Outcome:  Progressing     Problem: Nutrition/Hydration-ADULT  Goal: Nutrient/Hydration intake appropriate for improving, restoring or maintaining nutritional needs  Description: Monitor and assess patient's nutrition/hydration status for malnutrition. Collaborate with interdisciplinary team and initiate plan and interventions as ordered.  Monitor patient's weight and dietary intake as ordered or per policy. Utilize nutrition screening tool and intervene as necessary. Determine patient's food preferences and provide high-protein, high-caloric foods as appropriate.     INTERVENTIONS:  - Monitor oral intake, urinary output, labs, and treatment plans  - Assess nutrition and hydration status and recommend course of action  - Evaluate amount of meals eaten  - Assist patient with eating if necessary   - Allow adequate time for meals  - Recommend/ encourage appropriate diets, oral nutritional supplements, and vitamin/mineral supplements  - Order, calculate, and assess calorie counts as needed  - Recommend, monitor, and adjust tube feedings and TPN/PPN based on assessed needs  - Assess need for intravenous fluids  - Provide specific nutrition/hydration education as appropriate  - Include patient/family/caregiver in decisions related to nutrition  Outcome: Progressing     Problem: POSTPARTUM  Goal: Experiences normal postpartum course  Description: INTERVENTIONS:  - Monitor maternal vital signs  - Assess uterine involution and lochia  Outcome: Progressing  Goal: Appropriate maternal -  bonding  Description: INTERVENTIONS:  - Identify family support  - Assess for appropriate maternal/infant bonding   -Encourage maternal/infant bonding opportunities  - Referral to  or  as needed  Outcome: Progressing  Goal: Establishment of infant feeding pattern  Description: INTERVENTIONS:  - Assess breast/bottle feeding  - Refer to lactation as needed  Outcome: Progressing  Goal: Incision(s), wounds(s) or drain site(s)  healing without S/S of infection  Description: INTERVENTIONS  - Assess and document dressing, incision, wound bed, drain sites and surrounding tissue  - Provide patient and family education  - Perform skin care/dressing changes every   Outcome: Progressing

## 2024-06-27 NOTE — PLAN OF CARE
Problem: PAIN - ADULT  Goal: Verbalizes/displays adequate comfort level or baseline comfort level  Description: Interventions:  - Encourage patient to monitor pain and request assistance  - Assess pain using appropriate pain scale  - Administer analgesics based on type and severity of pain and evaluate response  - Implement non-pharmacological measures as appropriate and evaluate response  - Consider cultural and social influences on pain and pain management  - Notify physician/advanced practitioner if interventions unsuccessful or patient reports new pain  6/27/2024 1325 by Sunita Fan RN  Outcome: Progressing  6/27/2024 0937 by Sunita Fan RN  Outcome: Progressing     Problem: INFECTION - ADULT  Goal: Absence or prevention of progression during hospitalization  Description: INTERVENTIONS:  - Assess and monitor for signs and symptoms of infection  - Monitor lab/diagnostic results  - Monitor all insertion sites, i.e. indwelling lines, tubes, and drains  - Monitor endotracheal if appropriate and nasal secretions for changes in amount and color  - [place] appropriate cooling/warming therapies per order  - Administer medications as ordered  - Instruct and encourage patient and family to use good hand hygiene technique  - Identify and instruct in appropriate isolation precautions for identified infection/condition  6/27/2024 1325 by Sunita Fan RN  Outcome: Progressing  6/27/2024 0937 by Sunita Fan RN  Outcome: Progressing  Goal: Absence of fever/infection during neutropenic period  Description: INTERVENTIONS:  - Monitor WBC    6/27/2024 1325 by Sunita Fan RN  Outcome: Progressing  6/27/2024 0937 by Sunita Fan RN  Outcome: Progressing     Problem: SAFETY ADULT  Goal: Patient will remain free of falls  Description: INTERVENTIONS:  - Educate patient/family on patient safety including physical limitations  - Instruct patient to call for assistance with activity   - Consult OT/PT  to assist with strengthening/mobility   - Keep Call bell within reach  - Keep bed low and locked with side rails adjusted as appropriate  - Keep care items and personal belongings within reach  - Initiate and maintain comfort rounds  - Consider moving patient to room near nurses station  6/27/2024 1325 by Sunita Fan RN  Outcome: Progressing  6/27/2024 0937 by Sunita Fan RN  Outcome: Progressing  Goal: Maintain or return to baseline ADL function  Description: INTERVENTIONS:  -  Assess patient's ability to carry out ADLs; assess patient's baseline for ADL function and identify physical deficits which impact ability to perform ADLs (bathing, care of mouth/teeth, toileting, grooming, dressing, etc.)  - Assess/evaluate cause of self-care deficits   - Assess range of motion  - Assess patient's mobility; develop plan if impaired  - Assess patient's need for assistive devices and provide as appropriate  - Encourage maximum independence but intervene and supervise when necessary  - Involve family in performance of ADLs  - Assess for home care needs following discharge   - Consider OT consult to assist with ADL evaluation and planning for discharge  - Provide patient education as appropriate  6/27/2024 1325 by Sunita Fan RN  Outcome: Progressing  6/27/2024 0937 by Sunita Fan RN  Outcome: Progressing  Goal: Maintains/Returns to pre admission functional level  Description: INTERVENTIONS:  - Perform AM-PAC 6 Click Basic Mobility/ Daily Activity assessment daily.  - Set and communicate daily mobility goal to care team and patient/family/caregiver.   - Collaborate with rehabilitation services on mobility goals if consulted  - Out of bed for toileting  - Record patient progress and toleration of activity level   6/27/2024 1325 by Sunita Fan RN  Outcome: Progressing  6/27/2024 0937 by Sunita Fan RN  Outcome: Progressing     Problem: DISCHARGE PLANNING  Goal: Discharge to home or other  facility with appropriate resources  Description: INTERVENTIONS:  - Identify barriers to discharge w/patient and caregiver  - Arrange for needed discharge resources and transportation as appropriate  - Identify discharge learning needs (meds, wound care, etc.)  - Arrange for interpretive services to assist at discharge as needed  - Refer to Case Management Department for coordinating discharge planning if the patient needs post-hospital services based on physician/advanced practitioner order or complex needs related to functional status, cognitive ability, or social support system  6/27/2024 1325 by Sunita Fan RN  Outcome: Progressing  6/27/2024 0937 by Sunita Fan RN  Outcome: Progressing     Problem: Knowledge Deficit  Goal: Patient/family/caregiver demonstrates understanding of disease process, treatment plan, medications, and discharge instructions  Description: Complete learning assessment and assess knowledge base.  Interventions:  - Provide teaching at level of understanding  - Provide teaching via preferred learning methods  6/27/2024 1325 by Sunita Fan RN  Outcome: Progressing  6/27/2024 0937 by Sunita Fan RN  Outcome: Progressing  Goal: Verbalizes understanding of labor plan  Description: Assess patient/family/caregiver's baseline knowledge level and ability to understand information.  Provide education via patient/family/caregiver's preferred learning method at appropriate level of understanding.     1. Provide teaching at level of understanding.  2. Provide teaching via preferred learning method(s).  6/27/2024 1325 by Sunita Fan RN  Outcome: Progressing  6/27/2024 0937 by Sunita Fan RN  Outcome: Progressing     Problem: Nutrition/Hydration-ADULT  Goal: Nutrient/Hydration intake appropriate for improving, restoring or maintaining nutritional needs  Description: Monitor and assess patient's nutrition/hydration status for malnutrition. Collaborate with  interdisciplinary team and initiate plan and interventions as ordered.  Monitor patient's weight and dietary intake as ordered or per policy. Utilize nutrition screening tool and intervene as necessary. Determine patient's food preferences and provide high-protein, high-caloric foods as appropriate.     INTERVENTIONS:  - Monitor oral intake, urinary output, labs, and treatment plans  - Assess nutrition and hydration status and recommend course of action  - Evaluate amount of meals eaten  - Assist patient with eating if necessary   - Allow adequate time for meals  - Recommend/ encourage appropriate diets, oral nutritional supplements, and vitamin/mineral supplements  - Order, calculate, and assess calorie counts as needed  - Recommend, monitor, and adjust tube feedings and TPN/PPN based on assessed needs  - Assess need for intravenous fluids  - Provide specific nutrition/hydration education as appropriate  - Include patient/family/caregiver in decisions related to nutrition  2024 1325 by Sunita Fan RN  Outcome: Progressing  2024 0937 by Sunita Fan RN  Outcome: Progressing     Problem: POSTPARTUM  Goal: Experiences normal postpartum course  Description: INTERVENTIONS:  - Monitor maternal vital signs  - Assess uterine involution and lochia  Outcome: Progressing  Goal: Appropriate maternal -  bonding  Description: INTERVENTIONS:  - Identify family support  - Assess for appropriate maternal/infant bonding   -Encourage maternal/infant bonding opportunities  - Referral to  or  as needed  Outcome: Progressing  Goal: Establishment of infant feeding pattern  Description: INTERVENTIONS:  - Assess breast/bottle feeding  - Refer to lactation as needed  Outcome: Progressing  Goal: Incision(s), wounds(s) or drain site(s) healing without S/S of infection  Description: INTERVENTIONS  - Assess and document dressing, incision, wound bed, drain sites and surrounding tissue  -  Provide patient and family education  - Perform skin care/dressing changes every PRN  Outcome: Progressing

## 2024-06-27 NOTE — OB LABOR/OXYTOCIN SAFETY PROGRESS
Labor Progress Note - Cammy Mariee 38 y.o. female MRN: [id number]    Unit/Bed#: -01 Encounter: [id number]       Contraction Frequency (minutes): 2-3.5  Contraction Intensity: Moderate  Uterine Activity Characteristics: Regular  Cervical Dilation: 5-6        Cervical Effacement: 70  Fetal Station: -2  Baseline Rate (FHR): 140 bpm  Fetal Heart Rate (FHT): 145 BPM  FHR Category: II               Vital Signs:   Vitals:    06/27/24 0343   BP: 122/65   Pulse: 78   Resp:    Temp: 97.7 °F (36.5 °C)   SpO2:        Notes/comments:   Late decelerations following epidural. BP softer 100s/60s. Fluid bolus being given and Cammy is being repositioned. SVE unchanged as above. Anesthesia at bedside for evaluation.       Cecilia Moore MD 6/27/2024 3:54 AM

## 2024-06-27 NOTE — PLAN OF CARE
Problem: PAIN - ADULT  Goal: Verbalizes/displays adequate comfort level or baseline comfort level  Description: Interventions:  - Encourage patient to monitor pain and request assistance  - Assess pain using appropriate pain scale  - Administer analgesics based on type and severity of pain and evaluate response  - Implement non-pharmacological measures as appropriate and evaluate response  - Consider cultural and social influences on pain and pain management  - Notify physician/advanced practitioner if interventions unsuccessful or patient reports new pain  Outcome: Progressing     Problem: INFECTION - ADULT  Goal: Absence or prevention of progression during hospitalization  Description: INTERVENTIONS:  - Assess and monitor for signs and symptoms of infection  - Monitor lab/diagnostic results  - Monitor all insertion sites, i.e. indwelling lines, tubes, and drains  - Monitor endotracheal if appropriate and nasal secretions for changes in amount and color  - [place] appropriate cooling/warming therapies per order  - Administer medications as ordered  - Instruct and encourage patient and family to use good hand hygiene technique  - Identify and instruct in appropriate isolation precautions for identified infection/condition  Outcome: Progressing  Goal: Absence of fever/infection during neutropenic period  Description: INTERVENTIONS:  - Monitor WBC    Outcome: Progressing     Problem: SAFETY ADULT  Goal: Patient will remain free of falls  Description: INTERVENTIONS:  - Educate patient/family on patient safety including physical limitations  - Instruct patient to call for assistance with activity   - Consult OT/PT to assist with strengthening/mobility   - Keep Call bell within reach  - Keep bed low and locked with side rails adjusted as appropriate  - Keep care items and personal belongings within reach  - Initiate and maintain comfort rounds  - Consider moving patient to room near nurses station  Outcome:  Progressing  Goal: Maintain or return to baseline ADL function  Description: INTERVENTIONS:  -  Assess patient's ability to carry out ADLs; assess patient's baseline for ADL function and identify physical deficits which impact ability to perform ADLs (bathing, care of mouth/teeth, toileting, grooming, dressing, etc.)  - Assess/evaluate cause of self-care deficits   - Assess range of motion  - Assess patient's mobility; develop plan if impaired  - Assess patient's need for assistive devices and provide as appropriate  - Encourage maximum independence but intervene and supervise when necessary  - Involve family in performance of ADLs  - Assess for home care needs following discharge   - Consider OT consult to assist with ADL evaluation and planning for discharge  - Provide patient education as appropriate  Outcome: Progressing  Goal: Maintains/Returns to pre admission functional level  Description: INTERVENTIONS:  - Perform AM-PAC 6 Click Basic Mobility/ Daily Activity assessment daily.  - Set and communicate daily mobility goal to care team and patient/family/caregiver.   - Collaborate with rehabilitation services on mobility goals if consulted  - Out of bed for toileting  - Record patient progress and toleration of activity level   Outcome: Progressing     Problem: DISCHARGE PLANNING  Goal: Discharge to home or other facility with appropriate resources  Description: INTERVENTIONS:  - Identify barriers to discharge w/patient and caregiver  - Arrange for needed discharge resources and transportation as appropriate  - Identify discharge learning needs (meds, wound care, etc.)  - Arrange for interpretive services to assist at discharge as needed  - Refer to Case Management Department for coordinating discharge planning if the patient needs post-hospital services based on physician/advanced practitioner order or complex needs related to functional status, cognitive ability, or social support system  Outcome:  Progressing     Problem: Knowledge Deficit  Goal: Patient/family/caregiver demonstrates understanding of disease process, treatment plan, medications, and discharge instructions  Description: Complete learning assessment and assess knowledge base.  Interventions:  - Provide teaching at level of understanding  - Provide teaching via preferred learning methods  Outcome: Progressing  Goal: Verbalizes understanding of labor plan  Description: Assess patient/family/caregiver's baseline knowledge level and ability to understand information.  Provide education via patient/family/caregiver's preferred learning method at appropriate level of understanding.     1. Provide teaching at level of understanding.  2. Provide teaching via preferred learning method(s).  Outcome: Progressing     Problem: Labor & Delivery  Goal: Manages discomfort  Description: Assess and monitor for signs and symptoms of discomfort.  Assess patient's pain level regularly and per hospital policy.  Administer medications as ordered. Support use of nonpharmacological methods to help control pain such as distraction, imagery, relaxation, and application of heat and cold.  Collaborate with interdisciplinary team and patient to determine appropriate pain management plan.    1. Include patient in decisions related to comfort.  2. Offer non-pharmacological pain management interventions.  3. Report ineffective pain management to physician.  Outcome: Progressing  Goal: Patient vital signs are stable  Description: 1. Assess vital signs - vaginal delivery.  Outcome: Progressing     Problem: Nutrition/Hydration-ADULT  Goal: Nutrient/Hydration intake appropriate for improving, restoring or maintaining nutritional needs  Description: Monitor and assess patient's nutrition/hydration status for malnutrition. Collaborate with interdisciplinary team and initiate plan and interventions as ordered.  Monitor patient's weight and dietary intake as ordered or per policy.  Utilize nutrition screening tool and intervene as necessary. Determine patient's food preferences and provide high-protein, high-caloric foods as appropriate.     INTERVENTIONS:  - Monitor oral intake, urinary output, labs, and treatment plans  - Assess nutrition and hydration status and recommend course of action  - Evaluate amount of meals eaten  - Assist patient with eating if necessary   - Allow adequate time for meals  - Recommend/ encourage appropriate diets, oral nutritional supplements, and vitamin/mineral supplements  - Order, calculate, and assess calorie counts as needed  - Recommend, monitor, and adjust tube feedings and TPN/PPN based on assessed needs  - Assess need for intravenous fluids  - Provide specific nutrition/hydration education as appropriate  - Include patient/family/caregiver in decisions related to nutrition  Outcome: Progressing     Problem: BIRTH - VAGINAL/ SECTION  Goal: Fetal and maternal status remain reassuring during the birth process  Description: INTERVENTIONS:  - Monitor vital signs  - Monitor fetal heart rate  - Monitor uterine activity  - Monitor labor progression (vaginal delivery)  - DVT prophylaxis  - Antibiotic prophylaxis  Outcome: Progressing  Goal: Emotionally satisfying birthing experience for mother/fetus  Description: Interventions:  - Assess, plan, implement and evaluate the nursing care given to the patient in labor  - Advocate the philosophy that each childbirth experience is a unique experience and support the family's chosen level of involvement and control during the labor process   - Actively participate in both the patient's and family's teaching of the birth process  - Consider cultural, Restorationist and age-specific factors and plan care for the patient in labor  Outcome: Progressing

## 2024-06-27 NOTE — OB LABOR/OXYTOCIN SAFETY PROGRESS
Labor Progress Note - Cammy Mariee 38 y.o. female MRN: [id number]    Unit/Bed#: -01 Encounter: [id number]       Contraction Frequency (minutes): 4.5-6  Contraction Intensity: Moderate  Uterine Activity Characteristics: Regular  Cervical Dilation: 7        Cervical Effacement: 80  Fetal Station: -2  Baseline Rate (FHR): 140 bpm  Fetal Heart Rate (FHT): 150 BPM  FHR Category: I               Vital Signs:   Vitals:    06/27/24 0617   BP: 118/69   Pulse: 83   Resp:    Temp:    SpO2:        Notes/comments:   FHT cat I. Sve as above. Consider maternal repositioning and/or pitocin to encourage fetal descent.       Cecilia Moore MD 6/27/2024 6:39 AM

## 2024-06-27 NOTE — ANESTHESIA PROCEDURE NOTES
Epidural Block    Patient location during procedure: OB/L&D  Start time: 6/27/2024 3:32 AM  Reason for block: procedure for pain  Staffing  Performed by: Carlos Dobbins MD  Authorized by: Carlos Dobbins MD    Preanesthetic Checklist  Completed: patient identified, IV checked, site marked, risks and benefits discussed, surgical consent, monitors and equipment checked, pre-op evaluation and timeout performed  Epidural  Patient position: sitting  Prep: ChloraPrep  Sedation Level: no sedation  Patient monitoring: frequent blood pressure checks, continuous pulse oximetry and heart rate  Approach: midline  Location: lumbar, L3-4  Injection technique: JAMIE saline  Needle  Needle type: Tuohy   Needle gauge: 18 G  Needle insertion depth: 6 cm  Catheter type: multi-orifice  Catheter size: 20 G  Catheter at skin depth: 12 cm  Catheter securement method: stabilization device and clear occlusive dressing  Test dose: negativelidocaine-epinephrine (XYLOCAINE-MPF/EPINEPHRINE) 1.5 %-1:200,000 injection 3 mL - Epidural   3 mL - 6/27/2024 3:35:00 AM  Assessment  Sensory level: T10  Number of attempts: 1negative aspiration for CSF, negative aspiration for heme and no paresthesia on injection  patient tolerated the procedure well with no immediate complications  Additional Notes  Single pass. Easy procedure.

## 2024-06-27 NOTE — DISCHARGE SUMMARY
Discharge Summary - OB/GYN   Cammy Mariee 38 y.o. female MRN: [id number]  Unit/Bed#: -01 Encounter: [id number]      Admission Date: 2024     Discharge Date: 24    Admitting Diagnosis:   Patient Active Problem List   Diagnosis    Gastroesophageal reflux disease without esophagitis    Allergies    Mixed hyperlipidemia     (spontaneous vaginal delivery)    AMA (advanced maternal age) multigravida 35+, third trimester    Frequency of urination    Gestational hypertension, third trimester    Vaginal bleeding during pregnancy, antepartum    Severe obesity due to excess calories affecting pregnancy in third trimester (HCC)    Eczema        Discharge Diagnosis:   Same, delivered    Procedures: spontaneous vaginal delivery    Delivery Attending: Lillie Magallanes MD  Discharge Attending: Radha Morrow DO    Hospital Course:   Cammy Mariee is a 38 y.o.  at 37w1d wks who was initially admitted for scheduled induction of labor for gestational hypertension. Her pregnancy was complicated by gHTN (on labetalol 100 mg BID), advanced maternal age, and eczema. She presented for scheduled IOL. Upon presentation, SVE was 1.5/50/-3. IOL was started with FB. FB was expelled and amniotomy was performed for clear fluid. She progressed to complete and began pushing. While pushing, pitocin augmentation was started.    She delivered a viable male  on 24 at 1025. Weight 7lbs 9.8oz via spontaneous vaginal delivery. Apgars were 9 (1 min) and 9 (5 min).  was transferred to  nursery. Patient tolerated the procedure well and was transferred to recovery in stable condition.     Her postpartum course was uncomplicated. Her postpartum pain was well controlled with oral analgesics.    On day of discharge, she was ambulating and able to reasonably perform all ADLs. She was voiding and had appropriate bowel function. Pain was well controlled. She was discharged home on post-partum day #1 without  complications. Patient was instructed to follow up with her OB as an outpatient and was given appropriate warnings to call provider if she develops signs of infection or uncontrolled pain.    Complications: none apparent    Condition at discharge: good     Discharge instructions/Information to patient and family:   - Do not place anything (no partner, tampons or douche) in your vagina for 6 weeks.  -You may walk for exercise for the first 6 weeks then gradually return to your usual activities.   -Please do not drive for 1 week if you have no stitches and for 2 weeks if you have stitches or underwent a  delivery.    -You may take baths or shower per your preference.   -Please look at your bust (breasts) in the mirror daily and call for redness or tenderness or increased warmth.   -Please call us for temperature > 100.4*F or 38* C, worsening pain or a foul discharge.      Discharge Medications:   Prenatal vitamin daily for 6 months or the duration of nursing whichever is longer.  Motrin 600 mg orally every 6 hours as needed for pain  Tylenol (over the counter) per bottle directions as needed for pain: do NOT use with percocet  Hydrocortisone cream 1% (over the counter) applied 1-2x daily to hemorrhoids as needed    Planned Readmission: No    Leslie Emery MD  PGY-1 OBGYN

## 2024-06-28 VITALS
BODY MASS INDEX: 37.56 KG/M2 | RESPIRATION RATE: 18 BRPM | SYSTOLIC BLOOD PRESSURE: 124 MMHG | OXYGEN SATURATION: 97 % | HEART RATE: 90 BPM | WEIGHT: 220 LBS | DIASTOLIC BLOOD PRESSURE: 68 MMHG | TEMPERATURE: 97.5 F | HEIGHT: 64 IN

## 2024-06-28 PROCEDURE — 99024 POSTOP FOLLOW-UP VISIT: CPT | Performed by: STUDENT IN AN ORGANIZED HEALTH CARE EDUCATION/TRAINING PROGRAM

## 2024-06-28 RX ORDER — ACETAMINOPHEN 325 MG/1
650 TABLET ORAL EVERY 6 HOURS SCHEDULED
Start: 2024-06-28

## 2024-06-28 RX ORDER — IBUPROFEN 600 MG/1
600 TABLET ORAL EVERY 6 HOURS SCHEDULED
Qty: 30 TABLET | Refills: 0 | Status: SHIPPED | OUTPATIENT
Start: 2024-06-28

## 2024-06-28 RX ADMIN — IBUPROFEN 600 MG: 600 TABLET, FILM COATED ORAL at 06:00

## 2024-06-28 RX ADMIN — SENNOSIDES 8.6 MG: 8.6 TABLET, FILM COATED ORAL at 09:24

## 2024-06-28 RX ADMIN — ACETAMINOPHEN 650 MG: 325 TABLET, FILM COATED ORAL at 12:14

## 2024-06-28 RX ADMIN — LABETALOL HYDROCHLORIDE 100 MG: 100 TABLET, FILM COATED ORAL at 09:24

## 2024-06-28 RX ADMIN — LORATADINE 10 MG: 10 TABLET ORAL at 09:25

## 2024-06-28 RX ADMIN — IBUPROFEN 600 MG: 600 TABLET, FILM COATED ORAL at 12:14

## 2024-06-28 RX ADMIN — DOCUSATE SODIUM 100 MG: 100 CAPSULE, LIQUID FILLED ORAL at 09:25

## 2024-06-28 RX ADMIN — ACETAMINOPHEN 650 MG: 325 TABLET, FILM COATED ORAL at 05:57

## 2024-06-28 NOTE — UTILIZATION REVIEW
"NOTIFICATION OF INPATIENT ADMISSION   MATERNITY/DELIVERY AUTHORIZATION REQUEST   SERVICING FACILITY:   [facility]  Parent Child Health - L&D, [place], NICU  [address] [place] [address]  Tax ID: [id number]  NPI: [id number]   ATTENDING PROVIDER:  Attending Name and NPI#: Lillie Magallanes Md [[id number]]  Address: [address] [place] [address]  Phone: [phone]   ADMISSION INFORMATION:  Place of Service: Inpatient [facility]  Place of Service Code: 21  Inpatient Admission Date/Time: 24  3:00 PM  Discharge Date/Time: No discharge date for patient encounter.  Admitting Diagnosis Code/Description:  Encounter for induction of labor [Z34.90]     Mother: Cammy Mariee 1985 Estimated Date of Delivery: 24  Delivering clinician: Lillie Magallanes   OB History          3    Para   2    Term   2            AB   1    Living   2         SAB   1    IAB   0    Ectopic   0    Multiple   0    Live Births   2               [place] Name & MRN:   Information for the patient's :  Michael Mariee Boy (Cammy) [[id number]]   [place] Delivery Information:  Sex: male  Delivered 2024 10:25 AM by Vaginal, Spontaneous; Gestational Age: 37w1d     Measurements:  Weight: 7 lb 9.8 oz (3453 g);  Height: 19\"    APGAR 1 minute 5 minutes 10 minutes   Totals: 9 9       UTILIZATION REVIEW CONTACT:  Doretha Archer, Utilization   Network Utilization Review Department  Phone: [phone]  Fax [phone]  Email: [email or web address]  Contact for approvals/pending authorizations, clinical reviews, and discharge.     PHYSICIAN ADVISORY SERVICES:  Medical Necessity Denial & Nvip-hv-Humd Review  Phone: [phone]  Fax: [phone]  Email: [email or web address]     DISCHARGE SUPPORT TEAM:  For Patients Discharge Needs & Updates  Phone: [phone] opt. 2 Fax: [phone]  Email: [email or web address]      "

## 2024-06-28 NOTE — PLAN OF CARE
Problem: PAIN - ADULT  Goal: Verbalizes/displays adequate comfort level or baseline comfort level  Description: Interventions:  - Encourage patient to monitor pain and request assistance  - Assess pain using appropriate pain scale  - Administer analgesics based on type and severity of pain and evaluate response  - Implement non-pharmacological measures as appropriate and evaluate response  - Consider cultural and social influences on pain and pain management  - Notify physician/advanced practitioner if interventions unsuccessful or patient reports new pain  Outcome: Adequate for Discharge     Problem: INFECTION - ADULT  Goal: Absence or prevention of progression during hospitalization  Description: INTERVENTIONS:  - Assess and monitor for signs and symptoms of infection  - Monitor lab/diagnostic results  - Monitor all insertion sites, i.e. indwelling lines, tubes, and drains  - Monitor endotracheal if appropriate and nasal secretions for changes in amount and color  - [place] appropriate cooling/warming therapies per order  - Administer medications as ordered  - Instruct and encourage patient and family to use good hand hygiene technique  - Identify and instruct in appropriate isolation precautions for identified infection/condition  Outcome: Adequate for Discharge  Goal: Absence of fever/infection during neutropenic period  Description: INTERVENTIONS:  - Monitor WBC    Outcome: Adequate for Discharge     Problem: SAFETY ADULT  Goal: Patient will remain free of falls  Description: INTERVENTIONS:  - Educate patient/family on patient safety including physical limitations  - Instruct patient to call for assistance with activity   - Consult OT/PT to assist with strengthening/mobility   - Keep Call bell within reach  - Keep bed low and locked with side rails adjusted as appropriate  - Keep care items and personal belongings within reach  - Initiate and maintain comfort rounds  - Consider moving patient to room near  nurses station  Outcome: Adequate for Discharge  Goal: Maintain or return to baseline ADL function  Description: INTERVENTIONS:  -  Assess patient's ability to carry out ADLs; assess patient's baseline for ADL function and identify physical deficits which impact ability to perform ADLs (bathing, care of mouth/teeth, toileting, grooming, dressing, etc.)  - Assess/evaluate cause of self-care deficits   - Assess range of motion  - Assess patient's mobility; develop plan if impaired  - Assess patient's need for assistive devices and provide as appropriate  - Encourage maximum independence but intervene and supervise when necessary  - Involve family in performance of ADLs  - Assess for home care needs following discharge   - Consider OT consult to assist with ADL evaluation and planning for discharge  - Provide patient education as appropriate  Outcome: Adequate for Discharge  Goal: Maintains/Returns to pre admission functional level  Description: INTERVENTIONS:  - Perform AM-PAC 6 Click Basic Mobility/ Daily Activity assessment daily.  - Set and communicate daily mobility goal to care team and patient/family/caregiver.   - Collaborate with rehabilitation services on mobility goals if consulted  - Out of bed for toileting  - Record patient progress and toleration of activity level   Outcome: Adequate for Discharge     Problem: DISCHARGE PLANNING  Goal: Discharge to home or other facility with appropriate resources  Description: INTERVENTIONS:  - Identify barriers to discharge w/patient and caregiver  - Arrange for needed discharge resources and transportation as appropriate  - Identify discharge learning needs (meds, wound care, etc.)  - Arrange for interpretive services to assist at discharge as needed  - Refer to Case Management Department for coordinating discharge planning if the patient needs post-hospital services based on physician/advanced practitioner order or complex needs related to functional status,  cognitive ability, or social support system  Outcome: Adequate for Discharge     Problem: Nutrition/Hydration-ADULT  Goal: Nutrient/Hydration intake appropriate for improving, restoring or maintaining nutritional needs  Description: Monitor and assess patient's nutrition/hydration status for malnutrition. Collaborate with interdisciplinary team and initiate plan and interventions as ordered.  Monitor patient's weight and dietary intake as ordered or per policy. Utilize nutrition screening tool and intervene as necessary. Determine patient's food preferences and provide high-protein, high-caloric foods as appropriate.     INTERVENTIONS:  - Monitor oral intake, urinary output, labs, and treatment plans  - Assess nutrition and hydration status and recommend course of action  - Evaluate amount of meals eaten  - Assist patient with eating if necessary   - Allow adequate time for meals  - Recommend/ encourage appropriate diets, oral nutritional supplements, and vitamin/mineral supplements  - Order, calculate, and assess calorie counts as needed  - Recommend, monitor, and adjust tube feedings and TPN/PPN based on assessed needs  - Assess need for intravenous fluids  - Provide specific nutrition/hydration education as appropriate  - Include patient/family/caregiver in decisions related to nutrition  Outcome: Adequate for Discharge     Problem: POSTPARTUM  Goal: Experiences normal postpartum course  Description: INTERVENTIONS:  - Monitor maternal vital signs  - Assess uterine involution and lochia  Outcome: Adequate for Discharge  Goal: Appropriate maternal -  bonding  Description: INTERVENTIONS:  - Identify family support  - Assess for appropriate maternal/infant bonding   -Encourage maternal/infant bonding opportunities  - Referral to  or  as needed  Outcome: Adequate for Discharge  Goal: Establishment of infant feeding pattern  Description: INTERVENTIONS:  - Assess breast/bottle feeding  -  Refer to lactation as needed  Outcome: Adequate for Discharge  Goal: Incision(s), wounds(s) or drain site(s) healing without S/S of infection  Description: INTERVENTIONS  - Assess and document dressing, incision, wound bed, drain sites and surrounding tissue  - Provide patient and family education  - Perform skin care/dressing changes   Outcome: Adequate for Discharge

## 2024-06-28 NOTE — PROGRESS NOTES
Obstetrics Progress Note  Cammy Mariee 38 y.o. female MRN: [id number]  Unit/Bed#: -01 Encounter: [id number]    Assessment/Plan:    Postpartum Day #1 s/p , currently stable. Baby in room. By issue:    Eczema  Assessment & Plan  - Had it all her life  - New on bilateral thighs x2 weeks  - Home hydrocortisone ordered    Gestational hypertension, third trimester  Assessment & Plan  - On Labetalol 100 mg BID at home  CBC/CMP wnl; p:c ratio 0.16  Systolic (12hrs), Av , Min:116 , Max:142   Diastolic (12hrs), Av, Min:65, Max:94      *  (spontaneous vaginal delivery)  Assessment & Plan  - Continue routine postpartum care  - Pain well controlled with oral analgesics  - Voiding spontaneously  - Tolerating PO fluids and solids  - Encourage breastfeeding and familial bonding  - Disposition: anticipate d/c home PPD1-2      Subjective/Objective     Subjective:   No acute events overnight. Pain: controlled. Tolerating PO: yes. Voiding: yes. Flatus: passing. Ambulating: yes. Chest pain: no. Shortness of breath: no. Leg pain: no. Lochia: within normal limits. Baby in room, feeding via breast. She would like to go home today if baby is cleared.    Objective:   Vitals:   Temp:  [97.5 °F (36.4 °C)-98.8 °F (37.1 °C)] 98 °F (36.7 °C)  HR:  [] 61  Resp:  [16-18] 16  BP: ()/(54-94) 116/65       Intake/Output Summary (Last 24 hours) at 2024 0654  Last data filed at 2024 1630  Gross per 24 hour   Intake 3459.28 ml   Output 1462 ml   Net 1997.28 ml       Lab Results   Component Value Date    WBC 8.79 2024    HGB 9.8 (L) 2024    HCT 29.9 (L) 2024    MCV 81 (L) 2024     2024       Physical Exam:   General: alert and oriented x3, in no apparent distress  Cardiovascular: regular rate  Pulmonary: normal effort  Abdomen: Soft, non-tender, non-distended, no rebound or guarding. Uterine fundus firm and non-tender, -1 cm below the umbilicus  Extremities: Non tender with  minimal edema, scattered eczematous patches and excoriations across b/l LE noted      Leslie Emery MD  PGY-I, OBGYN  6/28/2024, 6:54 AM

## 2024-06-28 NOTE — LACTATION NOTE
"This note was copied from a baby's chart.  CONSULT - LACTATION  Baby Boy (Pola Mariee 1 days male MRN: [id number]    [facility] AN NURSERY Room / Bed: (N)/(N) Encounter: [id number]    Maternal Information     MOTHER:  Cammy Mariee  Maternal Age: 38 y.o.  OB History: # 1 - Date: 16, Sex: Male, Weight: 3062 g (6 lb 12 oz), GA: 38w0d, Type: Vaginal, Spontaneous, Apgar1: None, Apgar5: None, Living: Living, Birth Comments: None    # 2 - Date: , Sex: None, Weight: None, GA: 7w0d, Type: None, Apgar1: None, Apgar5: None, Living: None, Birth Comments: None    # 3 - Date: 24, Sex: Male, Weight: 3453 g (7 lb 9.8 oz), GA: 37w1d, Type: Vaginal, Spontaneous, Apgar1: 9, Apgar5: 9, Living: Living, Birth Comments: None   Previouse breast reduction surgery? No    Lactation history:   Has patient previously breast fed: Yes   How long had patient previously breast fed: \"a few months\" pumped until baby was 9 months old   Previous breast feeding complications: None     Past Surgical History:   Procedure Laterality Date    COLPOSCOPY         Birth information:  YOB: 2023   Time of birth: 10:25 AM   Sex: male   Delivery type: Vaginal, Spontaneous   Birth Weight: 3453 g (7 lb 9.8 oz)   Percent of Weight Change: -1%     Gestational Age: 37w1d   [unfilled]    Assessment     Breast and nipple assessment:  no clinical exam at this time     [place] Assessment: sleepy    Feeding assessment: no latch  LATCH:  Latch: Too sleepy or reluctant, no latch achieved   Audible Swallowing: None   Type of Nipple: Everted (After stimulation)   Comfort (Breast/Nipple): Soft/non-tender   Hold (Positioning): Partial assist, teach one side, mother does other, staff holds   LATCH Score: 5          Feeding recommendations:  breast feed on demand    Worked on positioning infant up at chest level and starting to feed infant with nose arriving at the nipple. Then, using areolar " compression to achieve a deep latch that is comfortable and exchanges optimum amounts of milk. I offered suggestions on positioning, for a more optimal latch, showed mom proper positioning, ear, shoulder hip in line, baby's arms open, not in between mom and baby, nose to nipple, hand at base of head/neck.    Baby is very sleepy at this time.  Encouraged skin to skin and hand pump and hand expression.    Dr. Vidal and Ciera Howe, RN aware that baby is sleepy, jittery and has not fed in about 6 hours.  RN to bedside to check blood sugar while mom pumps.    Met with mother. Provided mother with Ready, Set, Baby booklet which contained information on:  Hand expression with access to QR codes to review hand expression.  Positioning and latch reviewed as well as showing images of other feeding positions.  Discussed the properties of a good latch in any position.   Feeding on cue and what that means for recognizing infant's hunger, s/s that baby is getting enough milk and some s/s that breastfeeding dyad may need further help  Skin to Skin contact and benefits to mom and baby  Avoidance of pacifiers for the first month discussed.   Gave information on common concerns, what to expect the first few weeks after delivery, preparing for other caregivers, and how partners can help. Resources for support also provided.Met with mother to review the Discharge Breastfeeding book, including use of the the feeding log, expected number of feedings and output; breastfeeding and your lifestyle( medications, caffeine, drugs, alcohol use); how to recognize and and remedy at home and when to call the doctor for: breast engorgement, block milked ducts and mastitis; storage and preparation of breast milk; cleaning of bottles and pump parts; paced bottle feeding and how to contact the [facility] as needed.    Tabatha Agarwal, RN 6/28/2024 2:44 PM

## 2024-06-28 NOTE — PLAN OF CARE
Problem: PAIN - ADULT  Goal: Verbalizes/displays adequate comfort level or baseline comfort level  Description: Interventions:  - Encourage patient to monitor pain and request assistance  - Assess pain using appropriate pain scale  - Administer analgesics based on type and severity of pain and evaluate response  - Implement non-pharmacological measures as appropriate and evaluate response  - Consider cultural and social influences on pain and pain management  - Notify physician/advanced practitioner if interventions unsuccessful or patient reports new pain  Outcome: Progressing     Problem: INFECTION - ADULT  Goal: Absence or prevention of progression during hospitalization  Description: INTERVENTIONS:  - Assess and monitor for signs and symptoms of infection  - Monitor lab/diagnostic results  - Monitor all insertion sites, i.e. indwelling lines, tubes, and drains  - Monitor endotracheal if appropriate and nasal secretions for changes in amount and color  - [place] appropriate cooling/warming therapies per order  - Administer medications as ordered  - Instruct and encourage patient and family to use good hand hygiene technique  - Identify and instruct in appropriate isolation precautions for identified infection/condition  Outcome: Progressing  Goal: Absence of fever/infection during neutropenic period  Description: INTERVENTIONS:  - Monitor WBC    Outcome: Progressing     Problem: SAFETY ADULT  Goal: Patient will remain free of falls  Description: INTERVENTIONS:  - Educate patient/family on patient safety including physical limitations  - Instruct patient to call for assistance with activity   - Consult OT/PT to assist with strengthening/mobility   - Keep Call bell within reach  - Keep bed low and locked with side rails adjusted as appropriate  - Keep care items and personal belongings within reach  - Initiate and maintain comfort rounds  - Consider moving patient to room near nurses station  Outcome:  Progressing  Goal: Maintain or return to baseline ADL function  Description: INTERVENTIONS:  -  Assess patient's ability to carry out ADLs; assess patient's baseline for ADL function and identify physical deficits which impact ability to perform ADLs (bathing, care of mouth/teeth, toileting, grooming, dressing, etc.)  - Assess/evaluate cause of self-care deficits   - Assess range of motion  - Assess patient's mobility; develop plan if impaired  - Assess patient's need for assistive devices and provide as appropriate  - Encourage maximum independence but intervene and supervise when necessary  - Involve family in performance of ADLs  - Assess for home care needs following discharge   - Consider OT consult to assist with ADL evaluation and planning for discharge  - Provide patient education as appropriate  Outcome: Progressing  Goal: Maintains/Returns to pre admission functional level  Description: INTERVENTIONS:  - Perform AM-PAC 6 Click Basic Mobility/ Daily Activity assessment daily.  - Set and communicate daily mobility goal to care team and patient/family/caregiver.   - Collaborate with rehabilitation services on mobility goals if consulted  - Out of bed for toileting  - Record patient progress and toleration of activity level   Outcome: Progressing     Problem: DISCHARGE PLANNING  Goal: Discharge to home or other facility with appropriate resources  Description: INTERVENTIONS:  - Identify barriers to discharge w/patient and caregiver  - Arrange for needed discharge resources and transportation as appropriate  - Identify discharge learning needs (meds, wound care, etc.)  - Arrange for interpretive services to assist at discharge as needed  - Refer to Case Management Department for coordinating discharge planning if the patient needs post-hospital services based on physician/advanced practitioner order or complex needs related to functional status, cognitive ability, or social support system  Outcome:  Progressing     Problem: Nutrition/Hydration-ADULT  Goal: Nutrient/Hydration intake appropriate for improving, restoring or maintaining nutritional needs  Description: Monitor and assess patient's nutrition/hydration status for malnutrition. Collaborate with interdisciplinary team and initiate plan and interventions as ordered.  Monitor patient's weight and dietary intake as ordered or per policy. Utilize nutrition screening tool and intervene as necessary. Determine patient's food preferences and provide high-protein, high-caloric foods as appropriate.     INTERVENTIONS:  - Monitor oral intake, urinary output, labs, and treatment plans  - Assess nutrition and hydration status and recommend course of action  - Evaluate amount of meals eaten  - Assist patient with eating if necessary   - Allow adequate time for meals  - Recommend/ encourage appropriate diets, oral nutritional supplements, and vitamin/mineral supplements  - Order, calculate, and assess calorie counts as needed  - Recommend, monitor, and adjust tube feedings and TPN/PPN based on assessed needs  - Assess need for intravenous fluids  - Provide specific nutrition/hydration education as appropriate  - Include patient/family/caregiver in decisions related to nutrition  Outcome: Progressing     Problem: POSTPARTUM  Goal: Experiences normal postpartum course  Description: INTERVENTIONS:  - Monitor maternal vital signs  - Assess uterine involution and lochia  Outcome: Progressing  Goal: Appropriate maternal -  bonding  Description: INTERVENTIONS:  - Identify family support  - Assess for appropriate maternal/infant bonding   -Encourage maternal/infant bonding opportunities  - Referral to  or  as needed  Outcome: Progressing  Goal: Establishment of infant feeding pattern  Description: INTERVENTIONS:  - Assess breast/bottle feeding  - Refer to lactation as needed  Outcome: Progressing  Goal: Incision(s), wounds(s) or drain site(s)  healing without S/S of infection  Description: INTERVENTIONS  - Assess and document dressing, incision, wound bed, drain sites and surrounding tissue  - Provide patient and family education  Outcome: Progressing

## 2024-07-01 NOTE — UTILIZATION REVIEW
NOTIFICATION OF ADMISSION DISCHARGE   This is a Notification of Discharge from [facility]. Please be advised that this patient has been discharge from our facility. Below you will find the admission and discharge date and time including the patient’s disposition.   UTILIZATION REVIEW CONTACT:  Doretha Archer  Utilization   Network Utilization Review Department  Phone: [phone] x carefully listen to the prompts. All voicemails are confidential.  Email: [email or web address]     ADMISSION INFORMATION  PRESENTATION DATE: 6/26/2024  3:00 PM  OBERVATION ADMISSION DATE: N/A  INPATIENT ADMISSION DATE: 6/26/24  3:00 PM   DISCHARGE DATE: 6/28/2024  5:40 PM   DISPOSITION:Home/Self Care    Network Utilization Review Department  ATTENTION: Please call with any questions or concerns to [phone] and carefully listen to the prompts so that you are directed to the right person. All voicemails are confidential.   For Discharge needs, contact Care Management DC Support Team at [phone] opt. 2  Send all requests for admission clinical reviews, approved or denied determinations and any other requests to dedicated fax number below belonging to the campus where the patient is receiving treatment. List of dedicated fax numbers for the Facilities:  FACILITY NAME UR FAX NUMBER   ADMISSION DENIALS (Administrative/Medical Necessity) [phone]   DISCHARGE SUPPORT TEAM ([facility]) [phone]   PARENT CHILD HEALTH (Maternity/NICU/Pediatrics) [phone]   [facility] [phone]   [facility] [phone]   [facility] [phone]   [facility] [phone]   [facility] [phone]   [facility] [phone]   [facility] [phone]   [facility] [phone]    [facility] [phone]   [facility] [phone]   [facility] [phone]   [facility] [phone]

## 2024-07-03 LAB — PLACENTA IN STORAGE: NORMAL

## 2024-07-03 NOTE — ANESTHESIA PREPROCEDURE EVALUATION
Procedure:  LABOR ANALGESIA    Relevant Problems   CARDIO   (+) Mixed hyperlipidemia      GI/HEPATIC   (+) Gastroesophageal reflux disease without esophagitis      GYN   (+) AMA (advanced maternal age) multigravida 35+, third trimester        Physical Exam    Airway    Mallampati score: II  TM Distance: >3 FB  Neck ROM: full     Dental       Cardiovascular  Rate: normal    Pulmonary  Pulmonary exam normal     Other Findings  post-pubertal.      Anesthesia Plan  ASA Score- 2     Anesthesia Type- epidural with ASA Monitors.         Additional Monitors:     Airway Plan:            Plan Factors-    Induction-     Postoperative Plan-         Informed Consent- Anesthetic plan and risks discussed with patient.  I personally reviewed this patient with the CRNA. Discussed and agreed on the Anesthesia Plan with the CRNA..

## 2024-07-11 ENCOUNTER — TELEPHONE (OUTPATIENT)
Dept: OBGYN CLINIC | Facility: CLINIC | Age: 39
End: 2024-07-11

## 2024-07-15 ENCOUNTER — POSTPARTUM VISIT (OUTPATIENT)
Dept: OBGYN CLINIC | Facility: CLINIC | Age: 39
End: 2024-07-15

## 2024-07-15 VITALS
SYSTOLIC BLOOD PRESSURE: 122 MMHG | DIASTOLIC BLOOD PRESSURE: 80 MMHG | BODY MASS INDEX: 36.06 KG/M2 | WEIGHT: 211.2 LBS | HEIGHT: 64 IN

## 2024-07-15 PROCEDURE — PNV: Performed by: OBSTETRICS & GYNECOLOGY

## 2024-07-15 NOTE — PROGRESS NOTES
"OB POSTPARTUM VISIT PROGRESS NOTE  Date of Encounter: 7/15/2024    Cammy Mariee    : 1985  (39 y.o.)  MR: [id number]    Subjective   Cammy is in for her postpartum visit. She is now . She delivered by normal spontaneous vaginal delivery. She's generally doing well, denies current pain or bleeding issues, and has no significant depression issues. She is breast feeding exclusively. We discussed all appropriate contraceptive options and she chooses None.    LABS:    Objective   EXAM:  GENERAL: alert, well appearing, and in no distress.  VITALS: Blood pressure 122/80, height 5' 4\" (1.626 m), weight 95.8 kg (211 lb 3.2 oz), last menstrual period 10/11/2023, currently breastfeeding.  BMI: Body mass index is 36.25 kg/m².  NECK/THYROID: Commentwnl  HEART: rrr  LUNGS: Clear to auscultation.  BACK: No CVAT  BREASTS: Bilaterally nontender, no masses or nipple discharge.  ABDOMEN: Regular  EXTREMITIES: All normal.  PELVIC:   VULVA: Nml EGBUS.   VAGINA: Introitus well healed, slightly tender.   CERVIX: Normal, no discharge.   UTERUS: Anteverted, NSSC, [place], NT.   RIGHT ADNEXUM: Nontender, no mass.   LEFT ADNEXUM: Nontender, no mass.   RECTAL: Deferred.    Assessment & Plan   There are no diagnoses linked to this encounter.    Loly Alexandre MD    "

## 2024-07-18 ENCOUNTER — TELEPHONE (OUTPATIENT)
Dept: OBGYN CLINIC | Facility: CLINIC | Age: 39
End: 2024-07-18

## 2024-07-18 NOTE — TELEPHONE ENCOUNTER
.POSTPARTUM PHONE CALL ASSESSMENT    Date of Delivery: 6/27/2024  Delivering Provider: Colalillraymond  Mode: Vaginal   Delivery Notes/Complications: No   Do you still have bleeding/pain? If so, how much/how severe? Light bleeding   Regular BMs/Urination? yes  Breastfeeding/Formula/Both? breast  How are you doing emotionally? good   EPDS Score: 0  Do you have any other questions or concerns for us or your provider? No  Have you scheduled the pediatrician appointment with pediatrician? yes  Do you have a postpartum visit scheduled? yes   Date scheduled: 08/05/2024 Provider: Loly Alexandre MD

## 2024-08-05 ENCOUNTER — POSTPARTUM VISIT (OUTPATIENT)
Dept: OBGYN CLINIC | Facility: CLINIC | Age: 39
End: 2024-08-05
Payer: COMMERCIAL

## 2024-08-05 VITALS
SYSTOLIC BLOOD PRESSURE: 126 MMHG | HEIGHT: 64 IN | WEIGHT: 206 LBS | BODY MASS INDEX: 35.17 KG/M2 | DIASTOLIC BLOOD PRESSURE: 84 MMHG

## 2024-08-05 DIAGNOSIS — Z30.011 ENCOUNTER FOR INITIAL PRESCRIPTION OF CONTRACEPTIVE PILLS: ICD-10-CM

## 2024-08-05 RX ORDER — NORETHINDRONE ACETATE AND ETHINYL ESTRADIOL .02; 1 MG/1; MG/1
1 TABLET ORAL DAILY
Qty: 84 TABLET | Refills: 3 | Status: SHIPPED | OUTPATIENT
Start: 2024-08-05

## 2024-08-05 NOTE — PROGRESS NOTES
OB POSTPARTUM VISIT PROGRESS NOTE  Date of Encounter: 2024    Cammy Mariee    : 1985  (39 y.o.)  MR: [id number]    Subjective   Cammy is in for her postpartum visit. She is now . She delivered by normal spontaneous vaginal delivery. She's generally doing well, denies current pain or bleeding issues, and has no significant depression issues. She is breast feeding exclusively. We discussed all appropriate contraceptive options and she chooses Junel.  Her blood pressure has been normal since delivery and she will monitor it for a few weeks after beginning the contraceptive pill.    No visits with results within 1 Month(s) from this visit.   Latest known visit with results is:   Admission on 2024, Discharged on 2024   Component Date Value    ABO Grouping 2024 B     Rh Factor 2024 Positive     Antibody Screen 2024 Negative     Specimen Expiration Date 202440629     WBC 2024 8.79     RBC 2024 3.68 (L)     Hemoglobin 2024 9.8 (L)     Hematocrit 2024 29.9 (L)     MCV 2024 81 (L)     MCH 2024 26.6 (L)     MCHC 2024 32.8     RDW 2024 14.3     Platelets 2024 223     MPV 2024 9.4     Syphilis Total Antibody 2024 Non-reactive     Sodium 2024 135     Potassium 2024 3.9     Chloride 2024 105     CO2 2024 23     ANION GAP 2024 7     BUN 2024 10     Creatinine 2024 0.69     Glucose 2024 92     Calcium 2024 8.7     Corrected Calcium 2024 9.3     AST 2024 27     ALT 2024 26     Alkaline Phosphatase 2024 87     Total Protein 2024 6.0 (L)     Albumin 2024 3.3 (L)     Total Bilirubin 2024 0.25     eGFR 2024 111     Creatinine, Ur 2024 25.6     Protein Urine Random 2024 4     Prot/Creat Ratio, Ur 2024 0.16 (H)     Hepatitis B Surface Ag 2024 Non-reactive     pH, Cord Art 2024 7.220  "(L)     pCO2, Cord Art 06/27/2024 51.6     pO2, Cord Art 06/27/2024 26.1 (H)     HCO3, Cord Art 06/27/2024 20.6     Base Exc, Cord Art 06/27/2024 -7.3 (L)     O2 Content, Cord Art 06/27/2024 10.2     O2 Hgb, Arterial Cord 06/27/2024 52.5     pH, Cord Prabhu 06/27/2024 7.333     pCO2, Cord Prabhu 06/27/2024 41.3     pO2, Cord Prabhu 06/27/2024 26.0     HCO3, Cord Prabhu 06/27/2024 21.4     Base Exc, Cord Prabhu 06/27/2024 -4.2 (L)     O2 Cont, Cord Prabhu 06/27/2024 11.8     O2 HGB,VENOUS CORD 06/27/2024 61.3     PLACENTA IN STORAGE 06/27/2024 Placenta Discarded        Objective   EXAM:  GENERAL: alert, well appearing, and in no distress.  VITALS: Blood pressure 126/84, height 5' 4\" (1.626 m), weight 93.4 kg (206 lb), last menstrual period 10/11/2023, currently breastfeeding.  BMI: Body mass index is 35.36 kg/m².  NECK/THYROID: Pressure  HEART: rrr  LUNGS: Clear to auscultation.  BACK: No CVAT  BREASTS: Bilaterally nontender, no masses or nipple discharge.  ABDOMEN: Regular  EXTREMITIES: All normal.  PELVIC:   VULVA: Nml EGBUS.   VAGINA: Introitus well healed, slightly tender.   CERVIX: Normal, no discharge.   UTERUS: Anteverted, NSSC, [place], NT.   RIGHT ADNEXUM: Nontender, no mass.   LEFT ADNEXUM: Nontender, no mass.   RECTAL: Deferred.    Assessment & Plan   There are no diagnoses linked to this encounter.    Loly Alexandre MD    "

## 2025-02-10 ENCOUNTER — OFFICE VISIT (OUTPATIENT)
Dept: FAMILY MEDICINE CLINIC | Facility: CLINIC | Age: 40
End: 2025-02-10
Payer: COMMERCIAL

## 2025-02-10 VITALS
DIASTOLIC BLOOD PRESSURE: 80 MMHG | OXYGEN SATURATION: 98 % | HEIGHT: 64 IN | HEART RATE: 92 BPM | WEIGHT: 194 LBS | BODY MASS INDEX: 33.12 KG/M2 | SYSTOLIC BLOOD PRESSURE: 150 MMHG

## 2025-02-10 DIAGNOSIS — O13.3 GESTATIONAL HYPERTENSION, THIRD TRIMESTER: ICD-10-CM

## 2025-02-10 DIAGNOSIS — R00.2 PALPITATIONS: ICD-10-CM

## 2025-02-10 DIAGNOSIS — Z51.81 ENCOUNTER FOR MONITORING LONG-TERM PROTON PUMP INHIBITOR THERAPY: ICD-10-CM

## 2025-02-10 DIAGNOSIS — Z13.29 SCREENING FOR THYROID DISORDER: ICD-10-CM

## 2025-02-10 DIAGNOSIS — I10 ESSENTIAL HYPERTENSION: ICD-10-CM

## 2025-02-10 DIAGNOSIS — Z13.0 SCREENING, ANEMIA, DEFICIENCY, IRON: ICD-10-CM

## 2025-02-10 DIAGNOSIS — Z00.00 ENCOUNTER FOR SCREENING AND PREVENTATIVE CARE: Primary | ICD-10-CM

## 2025-02-10 DIAGNOSIS — Z13.220 SCREENING CHOLESTEROL LEVEL: ICD-10-CM

## 2025-02-10 DIAGNOSIS — Z86.39 HISTORY OF HYPERLIPIDEMIA: ICD-10-CM

## 2025-02-10 DIAGNOSIS — E78.2 MIXED HYPERLIPIDEMIA: ICD-10-CM

## 2025-02-10 DIAGNOSIS — K21.9 GASTROESOPHAGEAL REFLUX DISEASE WITHOUT ESOPHAGITIS: ICD-10-CM

## 2025-02-10 DIAGNOSIS — Z13.1 SCREENING FOR DIABETES MELLITUS: ICD-10-CM

## 2025-02-10 DIAGNOSIS — Z79.899 MEDICATION MANAGEMENT: ICD-10-CM

## 2025-02-10 DIAGNOSIS — Z79.899 ENCOUNTER FOR MONITORING LONG-TERM PROTON PUMP INHIBITOR THERAPY: ICD-10-CM

## 2025-02-10 PROBLEM — R35.0 FREQUENCY OF URINATION: Status: RESOLVED | Noted: 2024-01-31 | Resolved: 2025-02-10

## 2025-02-10 PROBLEM — O99.213 SEVERE OBESITY DUE TO EXCESS CALORIES AFFECTING PREGNANCY IN THIRD TRIMESTER (HCC): Status: RESOLVED | Noted: 2024-06-03 | Resolved: 2025-02-10

## 2025-02-10 PROBLEM — O46.90 VAGINAL BLEEDING DURING PREGNANCY, ANTEPARTUM: Status: RESOLVED | Noted: 2024-05-21 | Resolved: 2025-02-10

## 2025-02-10 PROBLEM — O09.523 AMA (ADVANCED MATERNAL AGE) MULTIGRAVIDA 35+, THIRD TRIMESTER: Status: RESOLVED | Noted: 2024-01-25 | Resolved: 2025-02-10

## 2025-02-10 PROBLEM — E66.01 SEVERE OBESITY DUE TO EXCESS CALORIES AFFECTING PREGNANCY IN THIRD TRIMESTER (HCC): Status: RESOLVED | Noted: 2024-06-03 | Resolved: 2025-02-10

## 2025-02-10 PROCEDURE — 99395 PREV VISIT EST AGE 18-39: CPT | Performed by: FAMILY MEDICINE

## 2025-02-10 NOTE — PATIENT INSTRUCTIONS
Patient Education     Valsartan (edilma STEVIE tan)   Brand Names: US Diovan   Brand Names: Ace Auro-Valsartan; Diovan; M-Valsartan; SANDOZ Valsartan; TARO-Valsartan; TEVA-Valsartan   Warning   Do not take if you are pregnant. Use during pregnancy may cause birth defects or loss of the unborn baby. If you get pregnant or plan on getting pregnant while taking this drug, call your doctor right away.  What is this drug used for?   It is used to treat high blood pressure.  It is used to treat heart failure (weak heart).  It is used to help heart function after a heart attack.  It may be given to you for other reasons. Talk with the doctor.  What do I need to tell my doctor BEFORE I take this drug?   If you are allergic to this drug; any part of this drug; or any other drugs, foods, or substances. Tell your doctor about the allergy and what signs you had.  If you are taking a drug that has aliskiren in it and you also have diabetes or kidney problems.  If you are breast-feeding. Do not breast-feed while you take this drug.  This is not a list of all drugs or health problems that interact with this drug.  Tell your doctor and pharmacist about all of your drugs (prescription or OTC, natural products, vitamins) and health problems. You must check to make sure that it is safe for you to take this drug with all of your drugs and health problems. Do not start, stop, or change the dose of any drug without checking with your doctor.  What are some things I need to know or do while I take this drug?   For all patients taking this drug:   Tell all of your health care providers that you take this drug. This includes your doctors, nurses, pharmacists, and dentists.  Avoid driving and doing other tasks or actions that call for you to be alert until you see how this drug affects you.  To lower the chance of feeling dizzy or passing out, rise slowly if you have been sitting or lying down. Be careful going up and down stairs.  It may take  several weeks to see the full effects.  Check your blood pressure as you have been told.  Have blood work checked as you have been told by the doctor. Talk with the doctor.  If you are taking a salt substitute that has potassium in it, a potassium-sparing diuretic, or a potassium product, talk with your doctor.  If you are on a low-salt or salt-free diet, talk with your doctor.  Talk with your doctor before you drink alcohol.  Be careful in hot weather or while being active. Drink lots of fluids to stop fluid loss.  Tell your doctor if you have too much sweat, fluid loss, throwing up, or loose stools. This may lead to low blood pressure.  If you are taking this drug and have high blood pressure, talk with your doctor before using OTC products that may raise blood pressure. These include cough or cold drugs, diet pills, stimulants, non-steroidal anti-inflammatory drugs (NSAIDs) like ibuprofen or naproxen, and some natural products or aids.  This drug may not work as well to lower blood pressure in Black patients. Sometimes another drug may need to be given with this drug. If you have any questions, talk with the doctor.  Children:   Different forms of this drug may be for use in different ages of children. Talk with the doctor before giving this drug to a child.  What are some side effects that I need to call my doctor about right away?   WARNING/CAUTION: Even though it may be rare, some people may have very bad and sometimes deadly side effects when taking a drug. Tell your doctor or get medical help right away if you have any of the following signs or symptoms that may be related to a very bad side effect:  Signs of an allergic reaction, like rash; hives; itching; red, swollen, blistered, or peeling skin with or without fever; wheezing; tightness in the chest or throat; trouble breathing, swallowing, or talking; unusual hoarseness; or swelling of the mouth, face, lips, tongue, or throat.  Signs of kidney problems  like unable to pass urine, change in how much urine is passed, blood in the urine, or a big weight gain.  Signs of high potassium levels like a heartbeat that does not feel normal; feeling confused; feeling weak, lightheaded, or dizzy; feeling like passing out; numbness or tingling; or shortness of breath.  Very bad dizziness or passing out.  Swelling in the arms or legs.  Change in eyesight.  What are some other side effects of this drug?   All drugs may cause side effects. However, many people have no side effects or only have minor side effects. Call your doctor or get medical help if any of these side effects or any other side effects bother you or do not go away:  Feeling dizzy, tired, or weak.  Stomach pain or diarrhea.  Back pain.  Joint pain.  Headache.  Flu-like signs.  These are not all of the side effects that may occur. If you have questions about side effects, call your doctor. Call your doctor for medical advice about side effects.  You may report side effects to your national health agency.  You may report side effects to the FDA at 1-867.683.1453. You may also report side effects at https://www.fda.gov/medwatch.  How is this drug best taken?   Use this drug as ordered by your doctor. Read all information given to you. Follow all instructions closely.  All products:   Take with or without food.  Take this drug at the same time of day.  Drink lots of noncaffeine liquids unless told to drink less liquid by your doctor.  Keep taking this drug as you have been told by your doctor or other health care provider, even if you feel well.  Liquid (solution):   Measure liquid doses carefully. Use the measuring device that comes with this drug. If there is none, ask the pharmacist for a device to measure this drug.  Tablets:   A liquid (suspension) can be made if you cannot swallow pills. Talk with your doctor or pharmacist.  If a liquid (suspension) is made, shake well before use.  Measure liquid doses  carefully. Use the measuring device that comes with this drug. If there is none, ask the pharmacist for a device to measure this drug.  What do I do if I miss a dose?   Take a missed dose as soon as you think about it.  If it is close to the time for your next dose, skip the missed dose and go back to your normal time.  Do not take 2 doses at the same time or extra doses.  How do I store and/or throw out this drug?   All products:   Store at room temperature in a dry place. Do not store in a bathroom.  Keep all drugs in a safe place. Keep all drugs out of the reach of children and pets.  Throw away unused or  drugs. Do not flush down a toilet or pour down a drain unless you are told to do so. Check with your pharmacist if you have questions about the best way to throw out drugs. There may be drug take-back programs in your area.  Tablets:   If a liquid (suspension) is made from the tablets, it may be stored at room temperature or in a refrigerator. If stored at room temperature, throw away any part not used after 30 days. If stored in a refrigerator, throw away any part not used after 75 days.  General drug facts   If your symptoms or health problems do not get better or if they become worse, call your doctor.  Do not share your drugs with others and do not take anyone else's drugs.  Some drugs may have another patient information leaflet. If you have any questions about this drug, please talk with your doctor, nurse, pharmacist, or other health care provider.  Some drugs may have another patient information leaflet. Check with your pharmacist. If you have any questions about this drug, please talk with your doctor, nurse, pharmacist, or other health care provider.  If you think there has been an overdose, call your poison control center or get medical care right away. Be ready to tell or show what was taken, how much, and when it happened.  Consumer Information Use and Disclaimer   This generalized  information is a limited summary of diagnosis, treatment, and/or medication information. It is not meant to be comprehensive and should be used as a tool to help the user understand and/or assess potential diagnostic and treatment options. It does NOT include all information about conditions, treatments, medications, side effects, or risks that may apply to a specific patient. It is not intended to be medical advice or a substitute for the medical advice, diagnosis, or treatment of a health care provider based on the health care provider's examination and assessment of a patient's specific and unique circumstances. Patients must speak with a health care provider for complete information about their health, medical questions, and treatment options, including any risks or benefits regarding use of medications. This information does not endorse any treatments or medications as safe, effective, or approved for treating a specific patient. [employer] and its affiliates disclaim any warranty or liability relating to this information or the use thereof. The use of this information is governed by the Terms of Use, available at https://www.[employer].com/en/know/clinical-effectiveness-terms.  Last Reviewed Date   2021-05-06  Copyright   © 2024 [employer] and its affiliates and/or licensors. All rights reserved.      Adult Patient Counseling Points     Nifedipine   What is this drug used for?   It is used to treat chest pain or pressure.  Some forms of this drug are approved to treat high blood pressure.  It may be given to you for other reasons. Talk with the doctor.  All drugs may cause side effects. However, many people have no side effects or only have minor side effects. Call your doctor or get medical help if any of these side effects or any other side effects bother you or do not go away:   Fatigue  Headache  Flushing  Heartburn  Nausea  Loss of strength and energy  Anxiety  Tablet shell in  stool  WARNING/CAUTION: Even though it may be rare, some people may have very bad and sometimes deadly side effects when taking a drug. Tell your doctor or get medical help right away if you have any of the following signs or symptoms that may be related to a very bad side effect:   Severe dizziness  Passing out  Chest pain  Abnormal heartbeat  Mood changes  Shortness of breath  Excessive weight gain  Swelling of arms or legs  Muscle pain  Muscle cramps  Tremors  Severe abdominal pain  Severe constipation  Black, tarry, or bloody stools  Vomiting blood  Signs of an allergic reaction, like rash; hives; itching; red, swollen, blistered, or peeling skin with or without fever; wheezing; tightness in the chest or throat; trouble breathing, swallowing, or talking; unusual hoarseness; or swelling of the mouth, face, lips, tongue, or throat.  Note: This is not a comprehensive list of all side effects. Talk to your doctor if you have questions.  Consumer Information Use and Disclaimer: This information should not be used to decide whether or not to take this medicine or any other medicine. Only the healthcare provider has the knowledge and training to decide which medicines are right for a specific patient. This information does not endorse any medicine as safe, effective, or approved for treating any patient or health condition. This is only a limited summary of general information about the medicine’s uses from the patient education leaflet and is not intended to be comprehensive. This limited summary does NOT include all information available about the possible uses, directions, warnings, precautions, interactions, adverse effects, or risks that may apply to this medicine. This information is not intended to provide medical advice, diagnosis or treatment and does not replace information you receive from the healthcare provider. For a more detailed summary of information about the risks and benefits of using this medicine,  please speak with your healthcare provider and review the entire patient education leaflet.  Copyright   © 2024 [employer] and its affiliates and/or licensors. All rights reserved.  Last Reviewed Date   2023-12-12

## 2025-02-10 NOTE — PROGRESS NOTES
New Patient Outpatient Note    HPI:     Cammy Mariee, 39 y.o. female  presents today as a new patient and establish care.  Patient presents today to establish care.  She was previously a patient of Dr. Escobar.  Patient does have a significant history of gestational hypertension, unfortunately today her blood pressure is 150/80.  She is not currently on any medication.  Additionally she has a history of GERD, hyperlipidemia, and eczema.  Currently she is on omeprazole 20 mg daily.    Family Hx  UTD in chart    Past Medical History:   Diagnosis Date    Abnormal Pap smear of cervix     Colpo X1    Allergic     AMA (advanced maternal age) multigravida 35+, third trimester 2024    Frequency of urination 2024    GERD (gastroesophageal reflux disease)     Hypertension     Miscarriage     4 years ago since      Severe obesity due to excess calories affecting pregnancy in third trimester (HCC) 2024     (spontaneous vaginal delivery) 2024    Vaginal bleeding during pregnancy, antepartum 2024    Varicella     as a child        Past Surgical History:   Procedure Laterality Date    COLPOSCOPY            Current Outpatient Medications:     cetirizine (ZyrTEC) 10 MG chewable tablet, Chew 10 mg daily, Disp: , Rfl:     Cholecalciferol (Vitamin D3) 125 MCG (5000 UT) CAPS, Take 5,000 Units by mouth in the morning, Disp: , Rfl:     norethindrone-ethinyl estradiol (Junel ) 1-20 MG-MCG per tablet, Take 1 tablet by mouth daily, Disp: 84 tablet, Rfl: 3    omeprazole (PriLOSEC) 20 mg delayed release capsule, Take 1 capsule (20 mg total) by mouth daily, Disp: 90 capsule, Rfl: 0    Prenatal Vit-Fe Fumarate-FA (PRENATAL VITAMINS PO), Take by mouth, Disp: , Rfl:      SOCIAL:   Rent/Own:  Renting  Currently living with: , 2 children  Stable food: Yes  Safe At Home: Yes  Hobbies:  Cooking, reading, walking  Profession/ employment: part time- therapist  Restriction to medical procedures:   none    SEXUAL HISTORY:   Preference:  Men  Sexually Active:  Yes  Birth Control:  OCPs    Psychological History  Psychiatric history: Anxious  History of inpatient:  none  Current Therapy/ Provider:  none  Current Medications:  None    Substance History  Smoking: None  Alcohol Use: None  Substance use:  None      ROS:   Review of Systems   Constitutional:  Negative for chills, fever and unexpected weight change.        Night sweats   HENT:  Negative for congestion, ear discharge, ear pain, hearing loss, postnasal drip, rhinorrhea, sinus pressure, sinus pain and sore throat.    Eyes:  Positive for visual disturbance (wears glasses/ contacts).   Respiratory:  Negative for cough, chest tightness and shortness of breath.    Cardiovascular:  Positive for palpitations (occasional). Negative for chest pain.   Gastrointestinal:  Negative for abdominal pain, blood in stool, constipation, diarrhea, nausea and vomiting.   Genitourinary:  Negative for dysuria and frequency.   Skin:  Negative for rash and wound.   Allergic/Immunologic: Positive for environmental allergies.   Neurological:  Negative for dizziness, light-headedness and headaches.   Psychiatric/Behavioral:  Negative for self-injury and suicidal ideas. The patient is nervous/anxious.           OBJECTIVE  Vitals:    02/10/25 1129   BP: 150/80   Pulse: 92   SpO2: 98%        Physical Exam  Constitutional:       General: She is not in acute distress.     Appearance: Normal appearance. She is obese. She is not ill-appearing, toxic-appearing or diaphoretic.   HENT:      Head: Normocephalic and atraumatic.      Right Ear: Tympanic membrane, ear canal and external ear normal. There is no impacted cerumen.      Left Ear: Tympanic membrane, ear canal and external ear normal. There is no impacted cerumen.      Nose: Nose normal. No congestion or rhinorrhea.      Mouth/Throat:      Mouth: Mucous membranes are moist.      Pharynx: Oropharynx is clear. No oropharyngeal exudate  or posterior oropharyngeal erythema.   Eyes:      General:         Right eye: No discharge.         Left eye: No discharge.      Pupils: Pupils are equal, round, and reactive to light.   Cardiovascular:      Rate and Rhythm: Normal rate and regular rhythm.      Heart sounds: Normal heart sounds. No murmur heard.     No friction rub. No gallop.   Pulmonary:      Effort: Pulmonary effort is normal. No respiratory distress.      Breath sounds: Normal breath sounds. No stridor. No wheezing, rhonchi or rales.   Abdominal:      General: Bowel sounds are normal. There is no distension.      Palpations: Abdomen is soft.      Tenderness: There is no abdominal tenderness.   Musculoskeletal:      Cervical back: Neck supple. No tenderness.   Lymphadenopathy:      Cervical: No cervical adenopathy.   Skin:     General: Skin is warm.      Capillary Refill: Capillary refill takes less than 2 seconds.   Neurological:      Mental Status: She is alert.      Sensory: No sensory deficit (Light touch present in all 4 extremities).      Motor: No weakness (5/5 in all 4 extremities).      Deep Tendon Reflexes: Reflexes normal (2/4, intact, C5, C6, L4, S1).            ASSESSMENT AND PLAN   Cammy was seen today for annual exam.  Diagnoses and all orders for this visit:    Encounter for screening and preventative care  Screening, anemia, deficiency, iron  Screening for diabetes mellitus  Screening cholesterol level  Screening for thyroid disorder  Patient requires follow-up baseline labs.  Most recent labs obtained in June 2024 were around the time that she gave birth to her son.  Therefore these are skewed.  Will obtain new labs to review for kidney function, liver function, electrolytes, fasting sugar, cholesterol, blood counts, and thyroid.  -     CBC and differential; Future  -     Iron Panel (Includes Ferritin, Iron Sat%, Iron, and TIBC); Future  -     Comprehensive metabolic panel; Future  -     Lipid panel; Future  -     TSH, 3rd  generation with Free T4 reflex; Future    Palpitations  Patient has history of intermittent palpitations.  This may be associated with anxiety, typically she is seen at after the birth of her child.  Will look for other etiologies including anemia, low iron, electrolyte abnormalities, low magnesium, or thyroid abnormalities.  -     CBC and differential; Future  -     Comprehensive metabolic panel; Future  -     TSH, 3rd generation with Free T4 reflex; Future  -     Methylmalonic acid, serum; Future  -     Magnesium; Future    Essential hypertension  Gestational hypertension, third trimester  Patient has a history of gestational hypertension and she presents today with a BP of 150/80.  I gave her information about the medications that I am considering placing her on.  I would like a baseline level for her kidney function so we can follow-up with it in about 3 to 4 weeks after starting medication.  I will likely start her on Diovan 20 mg daily.  She has a blood pressure cuff at home and knows to take her BP about 3-4 times per week.      Mixed hyperlipidemia  History of hyperlipidemia  History of mixed hyperlipidemia.  This is back in 2022, therefore updated lab values are necessary.  She did have significant elevations in total and LDL cholesterol, hopefully we will see that these have come down with time  -     Lipid panel; Future  -     Lipid panel    Gastroesophageal reflux disease without esophagitis  Encounter for monitoring long-term proton pump inhibitor therapy  Medication management  Patient has been on medication long-term, after reviewing the duration of treatment with PPI, I feel that it is necessary for her to be evaluated for underlying nutritional deficiencies.  Will obtain iron, magnesium, B12, and calcium.  -     CBC and differential; Future  -     Iron Panel (Includes Ferritin, Iron Sat%, Iron, and TIBC); Future  -     Comprehensive metabolic panel; Future  -     Methylmalonic acid, serum; Future  -      Magnesium; Future        DO [facility]  2/10/2025 12:24 PM

## 2025-02-18 ENCOUNTER — RESULTS FOLLOW-UP (OUTPATIENT)
Facility: HOSPITAL | Age: 40
End: 2025-02-18

## 2025-02-18 LAB
ALBUMIN SERPL-MCNC: 4.1 G/DL (ref 3.6–5.1)
ALBUMIN/GLOB SERPL: 1.6 (CALC) (ref 1–2.5)
ALP SERPL-CCNC: 58 U/L (ref 31–125)
ALT SERPL-CCNC: 15 U/L (ref 6–29)
AST SERPL-CCNC: 17 U/L (ref 10–30)
BASOPHILS # BLD AUTO: 78 CELLS/UL (ref 0–200)
BASOPHILS NFR BLD AUTO: 1.4 %
BILIRUB SERPL-MCNC: 0.4 MG/DL (ref 0.2–1.2)
BUN SERPL-MCNC: 17 MG/DL (ref 7–25)
BUN/CREAT SERPL: NORMAL (CALC) (ref 6–22)
CALCIUM SERPL-MCNC: 9.2 MG/DL (ref 8.6–10.2)
CHLORIDE SERPL-SCNC: 102 MMOL/L (ref 98–110)
CHOLEST SERPL-MCNC: 234 MG/DL
CHOLEST/HDLC SERPL: 3.4 (CALC)
CO2 SERPL-SCNC: 29 MMOL/L (ref 20–32)
CREAT SERPL-MCNC: 0.82 MG/DL (ref 0.5–0.97)
EOSINOPHIL # BLD AUTO: 274 CELLS/UL (ref 15–500)
EOSINOPHIL NFR BLD AUTO: 4.9 %
ERYTHROCYTE [DISTWIDTH] IN BLOOD BY AUTOMATED COUNT: 13.2 % (ref 11–15)
FERRITIN SERPL-MCNC: 6 NG/ML (ref 16–154)
GFR/BSA.PRED SERPLBLD CYS-BASED-ARV: 94 ML/MIN/1.73M2
GLOBULIN SER CALC-MCNC: 2.5 G/DL (CALC) (ref 1.9–3.7)
GLUCOSE SERPL-MCNC: 85 MG/DL (ref 65–99)
HCT VFR BLD AUTO: 40.1 % (ref 35–45)
HDLC SERPL-MCNC: 69 MG/DL
HGB BLD-MCNC: 13.1 G/DL (ref 11.7–15.5)
IRON SATN MFR SERPL: 24 % (CALC) (ref 16–45)
IRON SERPL-MCNC: 127 MCG/DL (ref 40–190)
LDLC SERPL CALC-MCNC: 144 MG/DL (CALC)
LYMPHOCYTES # BLD AUTO: 1714 CELLS/UL (ref 850–3900)
LYMPHOCYTES NFR BLD AUTO: 30.6 %
MAGNESIUM SERPL-MCNC: 2.1 MG/DL (ref 1.5–2.5)
MCH RBC QN AUTO: 26.2 PG (ref 27–33)
MCHC RBC AUTO-ENTMCNC: 32.7 G/DL (ref 32–36)
MCV RBC AUTO: 80.2 FL (ref 80–100)
METHYLMALONATE SERPL-SCNC: 135 NMOL/L (ref 55–335)
MONOCYTES # BLD AUTO: 482 CELLS/UL (ref 200–950)
MONOCYTES NFR BLD AUTO: 8.6 %
NEUTROPHILS # BLD AUTO: 3052 CELLS/UL (ref 1500–7800)
NEUTROPHILS NFR BLD AUTO: 54.5 %
NONHDLC SERPL-MCNC: 165 MG/DL (CALC)
PLATELET # BLD AUTO: 384 THOUSAND/UL (ref 140–400)
PMV BLD REES-ECKER: 9.5 FL (ref 7.5–12.5)
POTASSIUM SERPL-SCNC: 4.4 MMOL/L (ref 3.5–5.3)
PROT SERPL-MCNC: 6.6 G/DL (ref 6.1–8.1)
RBC # BLD AUTO: 5 MILLION/UL (ref 3.8–5.1)
SODIUM SERPL-SCNC: 137 MMOL/L (ref 135–146)
T4 FREE SERPL-MCNC: 1.2 NG/DL (ref 0.8–1.8)
TIBC SERPL-MCNC: 522 MCG/DL (CALC) (ref 250–450)
TRIGL SERPL-MCNC: 97 MG/DL
TSH SERPL-ACNC: 5.45 MIU/L
WBC # BLD AUTO: 5.6 THOUSAND/UL (ref 3.8–10.8)

## 2025-02-21 ENCOUNTER — PATIENT MESSAGE (OUTPATIENT)
Dept: FAMILY MEDICINE CLINIC | Facility: CLINIC | Age: 40
End: 2025-02-21

## 2025-02-21 DIAGNOSIS — I10 ESSENTIAL HYPERTENSION: ICD-10-CM

## 2025-02-21 DIAGNOSIS — O13.3 GESTATIONAL HYPERTENSION, THIRD TRIMESTER: Primary | ICD-10-CM

## 2025-02-21 RX ORDER — LABETALOL 100 MG/1
100 TABLET, FILM COATED ORAL 2 TIMES DAILY
Qty: 60 TABLET | Refills: 0 | Status: SHIPPED | OUTPATIENT
Start: 2025-02-21 | End: 2025-03-23

## 2025-03-14 ENCOUNTER — OFFICE VISIT (OUTPATIENT)
Dept: FAMILY MEDICINE CLINIC | Facility: CLINIC | Age: 40
End: 2025-03-14
Payer: COMMERCIAL

## 2025-03-14 VITALS
BODY MASS INDEX: 32.78 KG/M2 | OXYGEN SATURATION: 99 % | RESPIRATION RATE: 16 BRPM | HEART RATE: 61 BPM | HEIGHT: 64 IN | DIASTOLIC BLOOD PRESSURE: 90 MMHG | WEIGHT: 192 LBS | SYSTOLIC BLOOD PRESSURE: 130 MMHG

## 2025-03-14 DIAGNOSIS — Z87.59 HISTORY OF POSTPARTUM GESTATIONAL HYPERTENSION: Primary | ICD-10-CM

## 2025-03-14 DIAGNOSIS — I10 ESSENTIAL HYPERTENSION: ICD-10-CM

## 2025-03-14 DIAGNOSIS — Z86.79 HISTORY OF POSTPARTUM GESTATIONAL HYPERTENSION: Primary | ICD-10-CM

## 2025-03-14 PROCEDURE — 99213 OFFICE O/P EST LOW 20 MIN: CPT | Performed by: FAMILY MEDICINE

## 2025-03-14 RX ORDER — VALSARTAN 40 MG/1
20 TABLET ORAL DAILY
Qty: 30 TABLET | Refills: 0 | Status: SHIPPED | OUTPATIENT
Start: 2025-03-14

## 2025-03-14 NOTE — ASSESSMENT & PLAN NOTE
Patient still has elevated blood pressure.  Unfortunately although she did well on beta-blocker during her pregnancy, the medication seems to be getting her more of an issue and possibly changing her mood/energy.  Therefore we will discontinue the labetalol and start Diovan 20 mg daily.  She is able to monitor her symptoms closely and her blood pressure.  If she does feel that the blood pressure remains in the 140s to 150s systolic she can increase the dose from half a tablet to a full tablet of 40 mg.  Patient is no longer breast-feeding, therefore we are able to extend the range of medications that are available.  Patient is also to monitor her mood very closely.  She feels that it is secondary to blood pressure medication, but it is also around the time of the lows/postpartum depression.  Therefore we will have her monitor closely and I will follow-up with her next week to determine if further titration of medications required.  Orders:    valsartan (DIOVAN) 40 mg tablet; Take 0.5 tablets (20 mg total) by mouth daily

## 2025-03-14 NOTE — PROGRESS NOTES
Name: Cammy Mariee      : 1985      MRN: [id number]  Encounter Provider: Navdeep Gambino DO  Encounter Date: 3/14/2025   Encounter department: [facility]  :  Assessment & Plan  History of postpartum gestational hypertension  Patient still has elevated blood pressure.  Unfortunately although she did well on beta-blocker during her pregnancy, the medication seems to be getting her more of an issue and possibly changing her mood/energy.  Therefore we will discontinue the labetalol and start Diovan 20 mg daily.  She is able to monitor her symptoms closely and her blood pressure.  If she does feel that the blood pressure remains in the 140s to 150s systolic she can increase the dose from half a tablet to a full tablet of 40 mg.  Patient is no longer breast-feeding, therefore we are able to extend the range of medications that are available.  Patient is also to monitor her mood very closely.  She feels that it is secondary to blood pressure medication, but it is also around the time of the lows/postpartum depression.  Therefore we will have her monitor closely and I will follow-up with her next week to determine if further titration of medications required.  Orders:    valsartan (DIOVAN) 40 mg tablet; Take 0.5 tablets (20 mg total) by mouth daily    Essential hypertension    Orders:    valsartan (DIOVAN) 40 mg tablet; Take 0.5 tablets (20 mg total) by mouth daily           History of Present Illness   Patient presents today to follow-up for gestational hypertension.  The patient was monitoring her blood pressure upon birth of her child and was found to continue to have elevated blood pressures.  She required labetalol during her pregnancy for blood pressures as well.  Initially when starting medication of labetalol again postpartum, her pressures were in the 140s over high 90s.  With increasing dosage of labetalol to 200 mg twice daily, the blood pressure did not significantly  "improve.  Her systolics range between 126-147 with the predominant systolic pressure being in the 130s.  Her diastolic ranged between , with the average being in the mid 90s.  Patient feels that she is also been having difficulty with energy and anxiety.  It is unknown whether or not this is secondary to hormonal changes versus the beta-blocker that she is on currently.  We placed her on the beta-blocker initially because she was breast-feeding, unfortunately her milk supply was reduced for some reason, and she is now bottlefeeding.      Review of Systems   Constitutional:  Positive for fatigue. Negative for activity change, appetite change, chills and fever.   Eyes:  Negative for pain, redness and visual disturbance.   Respiratory:  Negative for cough, chest tightness and shortness of breath.    Cardiovascular:  Negative for chest pain and palpitations.   Neurological:  Positive for light-headedness (About 2 weeks ago, has not continued.). Negative for dizziness, seizures, syncope and headaches.       Objective   /90   Pulse 61   Resp 16   Ht 5' 4\" (1.626 m)   Wt 87.1 kg (192 lb)   SpO2 99%   BMI 32.96 kg/m²      Physical Exam  Constitutional:       General: She is not in acute distress.     Appearance: Normal appearance. She is obese. She is not ill-appearing, toxic-appearing or diaphoretic.   HENT:      Nose: Nose normal. No congestion or rhinorrhea.   Cardiovascular:      Rate and Rhythm: Normal rate and regular rhythm.      Heart sounds: Normal heart sounds. No murmur heard.     No friction rub. No gallop.   Pulmonary:      Effort: Pulmonary effort is normal. No respiratory distress.      Breath sounds: Normal breath sounds. No stridor. No wheezing, rhonchi or rales.   Abdominal:      General: Bowel sounds are normal. There is no distension.      Palpations: Abdomen is soft.      Tenderness: There is no abdominal tenderness.   Skin:     General: Skin is warm.      Capillary Refill: Capillary " refill takes less than 2 seconds.   Neurological:      Mental Status: She is alert.

## 2025-03-16 DIAGNOSIS — O13.3 GESTATIONAL HYPERTENSION, THIRD TRIMESTER: ICD-10-CM

## 2025-03-16 DIAGNOSIS — I10 ESSENTIAL HYPERTENSION: ICD-10-CM

## 2025-03-17 RX ORDER — LABETALOL 100 MG/1
100 TABLET, FILM COATED ORAL 2 TIMES DAILY
Qty: 180 TABLET | Refills: 1 | Status: SHIPPED | OUTPATIENT
Start: 2025-03-17 | End: 2025-03-17

## 2025-03-27 ENCOUNTER — PATIENT MESSAGE (OUTPATIENT)
Dept: FAMILY MEDICINE CLINIC | Facility: CLINIC | Age: 40
End: 2025-03-27

## 2025-03-27 DIAGNOSIS — Z86.79 HISTORY OF POSTPARTUM GESTATIONAL HYPERTENSION: ICD-10-CM

## 2025-03-27 DIAGNOSIS — I10 ESSENTIAL HYPERTENSION: Primary | ICD-10-CM

## 2025-03-27 DIAGNOSIS — Z87.59 HISTORY OF POSTPARTUM GESTATIONAL HYPERTENSION: ICD-10-CM

## 2025-04-13 RX ORDER — VALSARTAN 40 MG/1
60 TABLET ORAL DAILY
Qty: 45 TABLET | Refills: 2 | Status: SHIPPED | OUTPATIENT
Start: 2025-04-13 | End: 2025-04-23 | Stop reason: SDUPTHER

## 2025-04-23 DIAGNOSIS — Z86.79 HISTORY OF POSTPARTUM GESTATIONAL HYPERTENSION: ICD-10-CM

## 2025-04-23 DIAGNOSIS — I10 ESSENTIAL HYPERTENSION: ICD-10-CM

## 2025-04-23 DIAGNOSIS — Z87.59 HISTORY OF POSTPARTUM GESTATIONAL HYPERTENSION: ICD-10-CM

## 2025-04-23 RX ORDER — VALSARTAN 40 MG/1
60 TABLET ORAL DAILY
Qty: 135 TABLET | Refills: 1 | Status: SHIPPED | OUTPATIENT
Start: 2025-04-23

## 2025-04-25 ENCOUNTER — TELEPHONE (OUTPATIENT)
Age: 40
End: 2025-04-25

## 2025-04-25 NOTE — TELEPHONE ENCOUNTER
Cammy called to report that she received a bill for the test     *Methylmalonic acid, serum    She state that all other labs was cover except for that one and she would like to know what the doctor can do for her to avoid to pay that bill from [facility].     Please Advice

## 2025-06-05 ENCOUNTER — PATIENT MESSAGE (OUTPATIENT)
Dept: FAMILY MEDICINE CLINIC | Facility: CLINIC | Age: 40
End: 2025-06-05

## 2025-06-05 NOTE — PATIENT COMMUNICATION
Called patient.  Stable at this time but open to meeting for visit.  Put into schedule tomorrow at 12:20.  Will perform virtual.     DO Mahesh Guardado [facility]  6/5/2025 12:26 PM

## 2025-06-06 ENCOUNTER — TELEMEDICINE (OUTPATIENT)
Dept: FAMILY MEDICINE CLINIC | Facility: CLINIC | Age: 40
End: 2025-06-06

## 2025-06-06 VITALS — HEIGHT: 64 IN | BODY MASS INDEX: 32.78 KG/M2 | WEIGHT: 192 LBS

## 2025-06-06 DIAGNOSIS — F41.1 GAD (GENERALIZED ANXIETY DISORDER): Primary | ICD-10-CM

## 2025-06-06 DIAGNOSIS — F41.0 PANIC ATTACK: ICD-10-CM

## 2025-06-06 DIAGNOSIS — R45.1 RESTLESSNESS: ICD-10-CM

## 2025-06-06 RX ORDER — BUSPIRONE HYDROCHLORIDE 5 MG/1
5 TABLET ORAL 2 TIMES DAILY
Qty: 30 TABLET | Refills: 0 | Status: SHIPPED | OUTPATIENT
Start: 2025-06-06

## 2025-06-06 NOTE — PROGRESS NOTES
Virtual Regular Visit  Name: Cammy Mariee      : 1985      MRN: [id number]  Encounter Provider: Navdeep Gambino DO  Encounter Date: 2025   Encounter department: [facility]  :  Assessment & Plan  ARCHANA (generalized anxiety disorder)  Restlessness  Panic attack  We reviewed the different types of medication that might be helpful.  Patient is already aware of the importance of therapy and will be looking into this for herself as well.  Patient is not interested in taking a daily medication at this time, if it is necessary after attempting short acting medication she may be open to this at a later date.  Will start BuSpar, she is to start 5 mg at night to see how she does.  She may go up to 3 times a day if necessary.  I will attempt to touch base with her in 2 weeks at least to see how the medication is going.  If she has any significant side effects she is to discontinue the medication immediately.  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  We also reviewed hydroxyzine, she has attempted this medication in the past and has had significant grogginess and the feeling of hangover the day after.  We will avoid it for right now, but it is not necessarily off of the differential for possible treatment, I am not sure of the previous dosage.  Orders:    busPIRone (BUSPAR) 5 mg tablet; Take 1 tablet (5 mg total) by mouth 2 (two) times a day        History of Present Illness     Patient presents today for video visit due to increased anxiety and restlessness.  Patient has a history of anxiety in the past as well as during her pregnancy.  Over the course of the last several weeks to months she has been having increased anxiety and restlessness.  Over the course of the last several days she has had difficulty with sleeping due to the symptoms.  The patient has good insight and has been attempting to monitor sleep,  "food/drink intake, and monitor how she is feeling.  She does not know whether or not the beta-blockers improved her symptoms while she was on it for the blood pressure, but is interested in discussing medication to help with her symptoms.  She denies any suicidal homicidal ideation.  She describes it as an inner turmoil or restlessness that she feels when her anxiety increases.  Almost feeling like she has to get up and move or do something in order to satisfy the increased symptoms.    Anxiety  Presents for initial visit. Onset was 1 to 4 weeks ago. The problem has been gradually worsening. Symptoms include decreased concentration, depressed mood, excessive worry, insomnia, irritability, malaise, muscle tension, nervous/anxious behavior, panic and restlessness. Patient reports no chest pain, compulsions, confusion, dizziness, dry mouth, feeling of choking, hyperventilation, impotence, nausea, obsessions, palpitations, shortness of breath or suicidal ideas. Symptoms occur most days. The severity of symptoms is moderate. The symptoms are aggravated by family issues. Hours of sleep per night: Limited recently. The quality of sleep is poor.     Past treatments include nothing.     Review of Systems   Constitutional:  Positive for irritability.   Respiratory:  Negative for shortness of breath.    Cardiovascular:  Negative for chest pain and palpitations.   Gastrointestinal:  Negative for nausea.   Genitourinary:  Negative for impotence.   Neurological:  Negative for dizziness.   Psychiatric/Behavioral:  Positive for decreased concentration. Negative for confusion and suicidal ideas. The patient is nervous/anxious and has insomnia.        Objective   Ht 5' 4\" (1.626 m)   Wt 87.1 kg (192 lb)   BMI 32.96 kg/m²     Physical Exam  Limited PE secondary to video visit.   Patient seems to have good insight into current thoughts and feelings  Patient does seem to have increased nervousness/ jitteriness while on " phone.    Administrative Statements   Encounter provider Navdeep Gambino, DO    The Patient is located at Home and in the following state in which I hold an active license [place].    The patient was identified by name and date of birth. Cammy Mariee was informed that this is a telemedicine visit and that the visit is being conducted through the [employer] platform. She agrees to proceed..  My office door was closed. No one else was in the room.  She acknowledged consent and understanding of privacy and security of the video platform. The patient has agreed to participate and understands they can discontinue the visit at any time.    I have spent a total time of 18 minutes in caring for this patient on the day of the visit/encounter including Diagnostic results, Prognosis, Risks and benefits of tx options, Instructions for management, Documenting in the medical record, and Obtaining or reviewing history  , not including the time spent for establishing the audio/video connection.

## 2025-06-19 DIAGNOSIS — R45.1 RESTLESSNESS: ICD-10-CM

## 2025-06-19 DIAGNOSIS — F41.1 GAD (GENERALIZED ANXIETY DISORDER): ICD-10-CM

## 2025-06-19 DIAGNOSIS — F41.0 PANIC ATTACK: ICD-10-CM

## 2025-06-19 RX ORDER — BUSPIRONE HYDROCHLORIDE 5 MG/1
5 TABLET ORAL 3 TIMES DAILY PRN
Qty: 90 TABLET | Refills: 1 | Status: SHIPPED | OUTPATIENT
Start: 2025-06-19

## 2025-07-03 DIAGNOSIS — Z30.011 ENCOUNTER FOR INITIAL PRESCRIPTION OF CONTRACEPTIVE PILLS: ICD-10-CM

## 2025-07-03 RX ORDER — NORETHINDRONE ACETATE AND ETHINYL ESTRADIOL 20; 1 UG/1; MG/1
1 TABLET ORAL DAILY
Qty: 84 TABLET | Refills: 1 | Status: SHIPPED | OUTPATIENT
Start: 2025-07-03

## 2025-07-12 DIAGNOSIS — F41.0 PANIC ATTACK: ICD-10-CM

## 2025-07-12 DIAGNOSIS — R45.1 RESTLESSNESS: ICD-10-CM

## 2025-07-12 DIAGNOSIS — F41.1 GAD (GENERALIZED ANXIETY DISORDER): ICD-10-CM

## 2025-07-13 RX ORDER — BUSPIRONE HYDROCHLORIDE 5 MG/1
TABLET ORAL
Qty: 270 TABLET | Refills: 1 | Status: SHIPPED | OUTPATIENT
Start: 2025-07-13

## 2025-07-30 ENCOUNTER — TELEPHONE (OUTPATIENT)
Dept: OBGYN CLINIC | Facility: CLINIC | Age: 40
End: 2025-07-30